# Patient Record
Sex: FEMALE | Race: WHITE | NOT HISPANIC OR LATINO | Employment: FULL TIME | URBAN - METROPOLITAN AREA
[De-identification: names, ages, dates, MRNs, and addresses within clinical notes are randomized per-mention and may not be internally consistent; named-entity substitution may affect disease eponyms.]

---

## 2017-01-09 ENCOUNTER — COMMUNICATION - HEALTHEAST (OUTPATIENT)
Dept: FAMILY MEDICINE | Facility: CLINIC | Age: 37
End: 2017-01-09

## 2017-01-10 ENCOUNTER — COMMUNICATION - HEALTHEAST (OUTPATIENT)
Dept: FAMILY MEDICINE | Facility: CLINIC | Age: 37
End: 2017-01-10

## 2017-02-09 ENCOUNTER — AMBULATORY - HEALTHEAST (OUTPATIENT)
Dept: FAMILY MEDICINE | Facility: CLINIC | Age: 37
End: 2017-02-09

## 2017-02-09 DIAGNOSIS — Z34.80 ENCOUNTER FOR SUPERVISION OF OTHER NORMAL PREGNANCY: ICD-10-CM

## 2017-02-09 DIAGNOSIS — Z32.00 POSSIBLE PREGNANCY, NOT YET CONFIRMED: ICD-10-CM

## 2017-02-13 ENCOUNTER — PRENATAL OFFICE VISIT - HEALTHEAST (OUTPATIENT)
Dept: FAMILY MEDICINE | Facility: CLINIC | Age: 37
End: 2017-02-13

## 2017-02-13 DIAGNOSIS — O30.001 TWIN GESTATION IN FIRST TRIMESTER, UNSPECIFIED MULTIPLE GESTATION TYPE: ICD-10-CM

## 2017-02-13 LAB — HIV 1+2 AB+HIV1 P24 AG SERPL QL IA: NEGATIVE

## 2017-02-14 ENCOUNTER — HOSPITAL ENCOUNTER (OUTPATIENT)
Dept: ULTRASOUND IMAGING | Facility: HOSPITAL | Age: 37
Discharge: HOME | End: 2017-02-14
Attending: FAMILY MEDICINE

## 2017-02-14 DIAGNOSIS — O30.001 TWIN GESTATION IN FIRST TRIMESTER, UNSPECIFIED MULTIPLE GESTATION TYPE: ICD-10-CM

## 2017-02-14 LAB
HBV SURFACE AG SERPL QL IA: NEGATIVE
SYPHILIS RPR SCREEN - HISTORICAL: NORMAL

## 2017-02-20 ENCOUNTER — COMMUNICATION - HEALTHEAST (OUTPATIENT)
Dept: FAMILY MEDICINE | Facility: CLINIC | Age: 37
End: 2017-02-20

## 2017-03-01 ENCOUNTER — RECORDS - HEALTHEAST (OUTPATIENT)
Dept: ADMINISTRATIVE | Facility: OTHER | Age: 37
End: 2017-03-01

## 2017-03-13 ENCOUNTER — PRENATAL OFFICE VISIT - HEALTHEAST (OUTPATIENT)
Dept: FAMILY MEDICINE | Facility: CLINIC | Age: 37
End: 2017-03-13

## 2017-03-13 DIAGNOSIS — O30.001 TWIN GESTATION IN FIRST TRIMESTER, UNSPECIFIED MULTIPLE GESTATION TYPE: ICD-10-CM

## 2017-03-15 ENCOUNTER — COMMUNICATION - HEALTHEAST (OUTPATIENT)
Dept: FAMILY MEDICINE | Facility: CLINIC | Age: 37
End: 2017-03-15

## 2017-04-07 ENCOUNTER — COMMUNICATION - HEALTHEAST (OUTPATIENT)
Dept: FAMILY MEDICINE | Facility: CLINIC | Age: 37
End: 2017-04-07

## 2017-04-13 ENCOUNTER — RECORDS - HEALTHEAST (OUTPATIENT)
Dept: ADMINISTRATIVE | Facility: OTHER | Age: 37
End: 2017-04-13

## 2017-04-24 ENCOUNTER — RECORDS - HEALTHEAST (OUTPATIENT)
Dept: ADMINISTRATIVE | Facility: OTHER | Age: 37
End: 2017-04-24

## 2017-05-05 ENCOUNTER — PRENATAL OFFICE VISIT - HEALTHEAST (OUTPATIENT)
Dept: FAMILY MEDICINE | Facility: CLINIC | Age: 37
End: 2017-05-05

## 2017-05-05 DIAGNOSIS — O30.042 DICHORIONIC DIAMNIOTIC TWIN PREGNANCY IN SECOND TRIMESTER: ICD-10-CM

## 2017-05-05 DIAGNOSIS — Z34.80 ENCOUNTER FOR SUPERVISION OF OTHER NORMAL PREGNANCY: ICD-10-CM

## 2017-05-26 ENCOUNTER — RECORDS - HEALTHEAST (OUTPATIENT)
Dept: ADMINISTRATIVE | Facility: OTHER | Age: 37
End: 2017-05-26

## 2017-06-09 ENCOUNTER — COMMUNICATION - HEALTHEAST (OUTPATIENT)
Dept: FAMILY MEDICINE | Facility: CLINIC | Age: 37
End: 2017-06-09

## 2017-06-09 ENCOUNTER — RECORDS - HEALTHEAST (OUTPATIENT)
Dept: ADMINISTRATIVE | Facility: OTHER | Age: 37
End: 2017-06-09

## 2017-06-16 ENCOUNTER — PRENATAL OFFICE VISIT - HEALTHEAST (OUTPATIENT)
Dept: FAMILY MEDICINE | Facility: CLINIC | Age: 37
End: 2017-06-16

## 2017-06-16 DIAGNOSIS — Z67.91 RH NEGATIVE STATE IN ANTEPARTUM PERIOD, THIRD TRIMESTER: ICD-10-CM

## 2017-06-16 DIAGNOSIS — Z34.80 ENCOUNTER FOR SUPERVISION OF OTHER NORMAL PREGNANCY: ICD-10-CM

## 2017-06-16 DIAGNOSIS — O26.893 RH NEGATIVE STATE IN ANTEPARTUM PERIOD, THIRD TRIMESTER: ICD-10-CM

## 2017-06-16 DIAGNOSIS — O30.043 DICHORIONIC DIAMNIOTIC TWIN PREGNANCY IN THIRD TRIMESTER: ICD-10-CM

## 2017-06-19 LAB — SYPHILIS RPR SCREEN - HISTORICAL: NORMAL

## 2017-07-07 ENCOUNTER — PRENATAL OFFICE VISIT - HEALTHEAST (OUTPATIENT)
Dept: FAMILY MEDICINE | Facility: CLINIC | Age: 37
End: 2017-07-07

## 2017-07-07 DIAGNOSIS — Z34.80 ENCOUNTER FOR SUPERVISION OF OTHER NORMAL PREGNANCY: ICD-10-CM

## 2017-07-12 ENCOUNTER — COMMUNICATION - HEALTHEAST (OUTPATIENT)
Dept: FAMILY MEDICINE | Facility: CLINIC | Age: 37
End: 2017-07-12

## 2017-07-12 DIAGNOSIS — O30.009 TWIN PREGNANCY: ICD-10-CM

## 2017-07-18 ENCOUNTER — COMMUNICATION - HEALTHEAST (OUTPATIENT)
Dept: FAMILY MEDICINE | Facility: CLINIC | Age: 37
End: 2017-07-18

## 2017-08-04 ENCOUNTER — PRENATAL OFFICE VISIT - HEALTHEAST (OUTPATIENT)
Dept: FAMILY MEDICINE | Facility: CLINIC | Age: 37
End: 2017-08-04

## 2017-08-04 DIAGNOSIS — Z34.80 ENCOUNTER FOR SUPERVISION OF OTHER NORMAL PREGNANCY: ICD-10-CM

## 2017-08-07 ENCOUNTER — HOSPITAL ENCOUNTER (OUTPATIENT)
Dept: OBGYN | Facility: HOSPITAL | Age: 37
Discharge: HOME | End: 2017-08-07
Attending: OBSTETRICS & GYNECOLOGY | Admitting: OBSTETRICS & GYNECOLOGY

## 2017-08-07 ASSESSMENT — MIFFLIN-ST. JEOR: SCORE: 1618.9

## 2017-08-15 ENCOUNTER — RECORDS - HEALTHEAST (OUTPATIENT)
Dept: ADMINISTRATIVE | Facility: OTHER | Age: 37
End: 2017-08-15

## 2017-08-15 ENCOUNTER — COMMUNICATION - HEALTHEAST (OUTPATIENT)
Dept: FAMILY MEDICINE | Facility: CLINIC | Age: 37
End: 2017-08-15

## 2017-08-22 ENCOUNTER — COMMUNICATION - HEALTHEAST (OUTPATIENT)
Dept: FAMILY MEDICINE | Facility: CLINIC | Age: 37
End: 2017-08-22

## 2017-08-30 ENCOUNTER — SURGERY - HEALTHEAST (OUTPATIENT)
Dept: OBGYN | Facility: HOSPITAL | Age: 37
End: 2017-08-30

## 2017-08-30 ENCOUNTER — ANESTHESIA - HEALTHEAST (OUTPATIENT)
Dept: OBGYN | Facility: HOSPITAL | Age: 37
End: 2017-08-30

## 2017-08-30 ASSESSMENT — MIFFLIN-ST. JEOR: SCORE: 1659.72

## 2017-08-31 ENCOUNTER — HOME CARE/HOSPICE - HEALTHEAST (OUTPATIENT)
Dept: HOME HEALTH SERVICES | Facility: HOME HEALTH | Age: 37
End: 2017-08-31

## 2017-09-06 ENCOUNTER — COMMUNICATION - HEALTHEAST (OUTPATIENT)
Dept: OBGYN | Facility: HOSPITAL | Age: 37
End: 2017-09-06

## 2017-10-08 ENCOUNTER — COMMUNICATION - HEALTHEAST (OUTPATIENT)
Dept: FAMILY MEDICINE | Facility: CLINIC | Age: 37
End: 2017-10-08

## 2017-10-13 ENCOUNTER — OFFICE VISIT - HEALTHEAST (OUTPATIENT)
Dept: FAMILY MEDICINE | Facility: CLINIC | Age: 37
End: 2017-10-13

## 2017-10-13 ASSESSMENT — MIFFLIN-ST. JEOR: SCORE: 1546.32

## 2017-10-19 ENCOUNTER — COMMUNICATION - HEALTHEAST (OUTPATIENT)
Dept: SCHEDULING | Facility: CLINIC | Age: 37
End: 2017-10-19

## 2017-12-03 ENCOUNTER — COMMUNICATION - HEALTHEAST (OUTPATIENT)
Dept: FAMILY MEDICINE | Facility: CLINIC | Age: 37
End: 2017-12-03

## 2018-02-05 ENCOUNTER — COMMUNICATION - HEALTHEAST (OUTPATIENT)
Dept: SCHEDULING | Facility: CLINIC | Age: 38
End: 2018-02-05

## 2018-06-29 ENCOUNTER — COMMUNICATION - HEALTHEAST (OUTPATIENT)
Dept: FAMILY MEDICINE | Facility: CLINIC | Age: 38
End: 2018-06-29

## 2018-07-12 ENCOUNTER — OFFICE VISIT - HEALTHEAST (OUTPATIENT)
Dept: FAMILY MEDICINE | Facility: CLINIC | Age: 38
End: 2018-07-12

## 2018-07-12 DIAGNOSIS — M22.2X1 PATELLOFEMORAL PAIN SYNDROME OF RIGHT KNEE: ICD-10-CM

## 2018-07-12 DIAGNOSIS — M13.0 POLYARTHRITIS OF HAND: ICD-10-CM

## 2018-07-12 DIAGNOSIS — M79.672 PAIN IN BOTH FEET: ICD-10-CM

## 2018-07-12 DIAGNOSIS — M79.671 PAIN IN BOTH FEET: ICD-10-CM

## 2018-07-12 LAB
CK SERPL-CCNC: 89 U/L (ref 30–190)
ERYTHROCYTE [SEDIMENTATION RATE] IN BLOOD BY WESTERGREN METHOD: 21 MM/HR (ref 0–20)
RHEUMATOID FACT SERPL-ACNC: 17.1 IU/ML (ref 0–30)
TSH SERPL DL<=0.005 MIU/L-ACNC: 2.08 UIU/ML (ref 0.3–5)

## 2018-07-12 RX ORDER — CHLORAL HYDRATE 500 MG
2 CAPSULE ORAL DAILY
Status: SHIPPED | COMMUNITY
Start: 2018-07-12

## 2018-07-13 LAB
25(OH)D3 SERPL-MCNC: 36.6 NG/ML (ref 30–80)
25(OH)D3 SERPL-MCNC: 36.6 NG/ML (ref 30–80)
B BURGDOR IGG+IGM SER QL: 0.08 INDEX VALUE

## 2018-07-16 LAB — ANA SER QL: 0.1 U

## 2019-08-29 ENCOUNTER — COMMUNICATION - HEALTHEAST (OUTPATIENT)
Dept: FAMILY MEDICINE | Facility: CLINIC | Age: 39
End: 2019-08-29

## 2019-10-02 ENCOUNTER — OFFICE VISIT - HEALTHEAST (OUTPATIENT)
Dept: FAMILY MEDICINE | Facility: CLINIC | Age: 39
End: 2019-10-02

## 2019-10-02 DIAGNOSIS — E78.2 MIXED HYPERLIPIDEMIA: ICD-10-CM

## 2019-10-02 DIAGNOSIS — N92.6 IRREGULAR MENSES: ICD-10-CM

## 2019-10-02 DIAGNOSIS — Z00.00 ROUTINE GENERAL MEDICAL EXAMINATION AT A HEALTH CARE FACILITY: ICD-10-CM

## 2019-10-02 ASSESSMENT — MIFFLIN-ST. JEOR: SCORE: 1526.47

## 2019-10-03 LAB
HPV SOURCE: NORMAL
HUMAN PAPILLOMA VIRUS 16 DNA: NEGATIVE
HUMAN PAPILLOMA VIRUS 18 DNA: NEGATIVE
HUMAN PAPILLOMA VIRUS FINAL DIAGNOSIS: NORMAL
HUMAN PAPILLOMA VIRUS OTHER HR: NEGATIVE
SPECIMEN DESCRIPTION: NORMAL

## 2019-10-10 LAB

## 2019-12-03 ENCOUNTER — COMMUNICATION - HEALTHEAST (OUTPATIENT)
Dept: FAMILY MEDICINE | Facility: CLINIC | Age: 39
End: 2019-12-03

## 2019-12-12 ENCOUNTER — OFFICE VISIT - HEALTHEAST (OUTPATIENT)
Dept: FAMILY MEDICINE | Facility: CLINIC | Age: 39
End: 2019-12-12

## 2019-12-12 DIAGNOSIS — B07.0 PLANTAR WARTS: ICD-10-CM

## 2019-12-12 DIAGNOSIS — Z23 NEED FOR INFLUENZA VACCINATION: ICD-10-CM

## 2019-12-30 ENCOUNTER — AMBULATORY - HEALTHEAST (OUTPATIENT)
Dept: LAB | Facility: CLINIC | Age: 39
End: 2019-12-30

## 2019-12-30 DIAGNOSIS — E78.2 MIXED HYPERLIPIDEMIA: ICD-10-CM

## 2019-12-30 LAB
CHOLEST SERPL-MCNC: 263 MG/DL
FASTING STATUS PATIENT QL REPORTED: YES
HDLC SERPL-MCNC: 46 MG/DL
LDLC SERPL CALC-MCNC: 139 MG/DL
TRIGL SERPL-MCNC: 389 MG/DL

## 2019-12-31 ENCOUNTER — COMMUNICATION - HEALTHEAST (OUTPATIENT)
Dept: FAMILY MEDICINE | Facility: CLINIC | Age: 39
End: 2019-12-31

## 2020-04-13 ENCOUNTER — COMMUNICATION - HEALTHEAST (OUTPATIENT)
Dept: FAMILY MEDICINE | Facility: CLINIC | Age: 40
End: 2020-04-13

## 2020-04-15 ENCOUNTER — COMMUNICATION - HEALTHEAST (OUTPATIENT)
Dept: FAMILY MEDICINE | Facility: CLINIC | Age: 40
End: 2020-04-15

## 2020-04-20 ENCOUNTER — OFFICE VISIT - HEALTHEAST (OUTPATIENT)
Dept: FAMILY MEDICINE | Facility: CLINIC | Age: 40
End: 2020-04-20

## 2020-04-20 DIAGNOSIS — D22.5: ICD-10-CM

## 2020-05-06 ENCOUNTER — COMMUNICATION - HEALTHEAST (OUTPATIENT)
Dept: FAMILY MEDICINE | Facility: CLINIC | Age: 40
End: 2020-05-06

## 2020-05-06 DIAGNOSIS — D22.9 CHANGE IN MOLE: ICD-10-CM

## 2020-05-15 ENCOUNTER — RECORDS - HEALTHEAST (OUTPATIENT)
Dept: ADMINISTRATIVE | Facility: OTHER | Age: 40
End: 2020-05-15

## 2020-07-16 ENCOUNTER — COMMUNICATION - HEALTHEAST (OUTPATIENT)
Dept: FAMILY MEDICINE | Facility: CLINIC | Age: 40
End: 2020-07-16

## 2020-07-16 DIAGNOSIS — N92.6 IRREGULAR MENSES: ICD-10-CM

## 2020-07-16 RX ORDER — NORETHINDRONE ACETATE AND ETHINYL ESTRADIOL 1.5; 3 MG/1; UG/1
TABLET ORAL
Qty: 21 TABLET | Refills: 8 | Status: SHIPPED | OUTPATIENT
Start: 2020-07-16 | End: 2022-03-09

## 2020-09-25 ENCOUNTER — RECORDS - HEALTHEAST (OUTPATIENT)
Dept: ADMINISTRATIVE | Facility: OTHER | Age: 40
End: 2020-09-25

## 2020-10-07 ENCOUNTER — RECORDS - HEALTHEAST (OUTPATIENT)
Dept: ADMINISTRATIVE | Facility: OTHER | Age: 40
End: 2020-10-07

## 2020-11-06 ENCOUNTER — RECORDS - HEALTHEAST (OUTPATIENT)
Dept: ADMINISTRATIVE | Facility: OTHER | Age: 40
End: 2020-11-06

## 2020-11-18 ENCOUNTER — OFFICE VISIT - HEALTHEAST (OUTPATIENT)
Dept: FAMILY MEDICINE | Facility: CLINIC | Age: 40
End: 2020-11-18

## 2020-11-18 DIAGNOSIS — Z13.1 SCREENING FOR DIABETES MELLITUS: ICD-10-CM

## 2020-11-18 DIAGNOSIS — E78.2 MIXED HYPERLIPIDEMIA: ICD-10-CM

## 2020-11-18 DIAGNOSIS — Z00.00 ROUTINE GENERAL MEDICAL EXAMINATION AT A HEALTH CARE FACILITY: ICD-10-CM

## 2020-11-18 DIAGNOSIS — N92.6 IRREGULAR MENSES: ICD-10-CM

## 2020-11-18 DIAGNOSIS — M25.512 LEFT SHOULDER PAIN, UNSPECIFIED CHRONICITY: ICD-10-CM

## 2020-11-18 LAB
CHOLEST SERPL-MCNC: 245 MG/DL
FASTING STATUS PATIENT QL REPORTED: YES
FASTING STATUS PATIENT QL REPORTED: YES
GLUCOSE BLD-MCNC: 74 MG/DL (ref 70–99)
HDLC SERPL-MCNC: 51 MG/DL
LDLC SERPL CALC-MCNC: 159 MG/DL
PROLACTIN SERPL-MCNC: 7.1 NG/ML (ref 0–20)
T4 FREE SERPL-MCNC: 1 NG/DL (ref 0.7–1.8)
TRIGL SERPL-MCNC: 174 MG/DL
TSH SERPL DL<=0.005 MIU/L-ACNC: 2.46 UIU/ML (ref 0.3–5)

## 2020-11-18 RX ORDER — KETOCONAZOLE 20 MG/G
CREAM TOPICAL
Status: SHIPPED | COMMUNITY
Start: 2020-11-06 | End: 2022-11-02

## 2020-11-18 ASSESSMENT — MIFFLIN-ST. JEOR: SCORE: 1505.77

## 2020-12-04 ENCOUNTER — OFFICE VISIT - HEALTHEAST (OUTPATIENT)
Dept: PHYSICAL THERAPY | Facility: REHABILITATION | Age: 40
End: 2020-12-04

## 2020-12-04 DIAGNOSIS — M25.612 DECREASED ROM OF LEFT SHOULDER: ICD-10-CM

## 2020-12-04 DIAGNOSIS — G89.29 CHRONIC PAIN OF BOTH SHOULDERS: ICD-10-CM

## 2020-12-04 DIAGNOSIS — M75.42 IMPINGEMENT SYNDROME OF LEFT SHOULDER: ICD-10-CM

## 2020-12-04 DIAGNOSIS — M75.41 IMPINGEMENT SYNDROME OF RIGHT SHOULDER: ICD-10-CM

## 2020-12-04 DIAGNOSIS — M25.511 CHRONIC PAIN OF BOTH SHOULDERS: ICD-10-CM

## 2020-12-04 DIAGNOSIS — M25.512 CHRONIC PAIN OF BOTH SHOULDERS: ICD-10-CM

## 2020-12-04 DIAGNOSIS — M25.611 DECREASED ROM OF RIGHT SHOULDER: ICD-10-CM

## 2020-12-11 ENCOUNTER — OFFICE VISIT - HEALTHEAST (OUTPATIENT)
Dept: PHYSICAL THERAPY | Facility: REHABILITATION | Age: 40
End: 2020-12-11

## 2020-12-11 DIAGNOSIS — M75.41 IMPINGEMENT SYNDROME OF RIGHT SHOULDER: ICD-10-CM

## 2020-12-11 DIAGNOSIS — M25.611 DECREASED ROM OF RIGHT SHOULDER: ICD-10-CM

## 2020-12-11 DIAGNOSIS — M75.42 IMPINGEMENT SYNDROME OF LEFT SHOULDER: ICD-10-CM

## 2020-12-11 DIAGNOSIS — M25.511 CHRONIC PAIN OF BOTH SHOULDERS: ICD-10-CM

## 2020-12-11 DIAGNOSIS — M25.512 CHRONIC PAIN OF BOTH SHOULDERS: ICD-10-CM

## 2020-12-11 DIAGNOSIS — G89.29 CHRONIC PAIN OF BOTH SHOULDERS: ICD-10-CM

## 2020-12-11 DIAGNOSIS — M25.612 DECREASED ROM OF LEFT SHOULDER: ICD-10-CM

## 2020-12-18 ENCOUNTER — OFFICE VISIT - HEALTHEAST (OUTPATIENT)
Dept: PHYSICAL THERAPY | Facility: REHABILITATION | Age: 40
End: 2020-12-18

## 2020-12-18 DIAGNOSIS — M25.611 DECREASED ROM OF RIGHT SHOULDER: ICD-10-CM

## 2020-12-18 DIAGNOSIS — M25.512 CHRONIC PAIN OF BOTH SHOULDERS: ICD-10-CM

## 2020-12-18 DIAGNOSIS — M75.41 IMPINGEMENT SYNDROME OF RIGHT SHOULDER: ICD-10-CM

## 2020-12-18 DIAGNOSIS — M75.42 IMPINGEMENT SYNDROME OF LEFT SHOULDER: ICD-10-CM

## 2020-12-18 DIAGNOSIS — M25.511 CHRONIC PAIN OF BOTH SHOULDERS: ICD-10-CM

## 2020-12-18 DIAGNOSIS — G89.29 CHRONIC PAIN OF BOTH SHOULDERS: ICD-10-CM

## 2020-12-18 DIAGNOSIS — M25.612 DECREASED ROM OF LEFT SHOULDER: ICD-10-CM

## 2020-12-22 ENCOUNTER — OFFICE VISIT - HEALTHEAST (OUTPATIENT)
Dept: PHYSICAL THERAPY | Facility: REHABILITATION | Age: 40
End: 2020-12-22

## 2020-12-22 DIAGNOSIS — M75.41 IMPINGEMENT SYNDROME OF RIGHT SHOULDER: ICD-10-CM

## 2020-12-22 DIAGNOSIS — M25.512 CHRONIC PAIN OF BOTH SHOULDERS: ICD-10-CM

## 2020-12-22 DIAGNOSIS — M25.611 DECREASED ROM OF RIGHT SHOULDER: ICD-10-CM

## 2020-12-22 DIAGNOSIS — M25.511 CHRONIC PAIN OF BOTH SHOULDERS: ICD-10-CM

## 2020-12-22 DIAGNOSIS — M25.612 DECREASED ROM OF LEFT SHOULDER: ICD-10-CM

## 2020-12-22 DIAGNOSIS — M75.42 IMPINGEMENT SYNDROME OF LEFT SHOULDER: ICD-10-CM

## 2020-12-22 DIAGNOSIS — G89.29 CHRONIC PAIN OF BOTH SHOULDERS: ICD-10-CM

## 2020-12-29 ENCOUNTER — OFFICE VISIT - HEALTHEAST (OUTPATIENT)
Dept: PHYSICAL THERAPY | Facility: REHABILITATION | Age: 40
End: 2020-12-29

## 2020-12-29 DIAGNOSIS — M25.611 DECREASED ROM OF RIGHT SHOULDER: ICD-10-CM

## 2020-12-29 DIAGNOSIS — M25.511 CHRONIC PAIN OF BOTH SHOULDERS: ICD-10-CM

## 2020-12-29 DIAGNOSIS — M25.512 CHRONIC PAIN OF BOTH SHOULDERS: ICD-10-CM

## 2020-12-29 DIAGNOSIS — M75.42 IMPINGEMENT SYNDROME OF LEFT SHOULDER: ICD-10-CM

## 2020-12-29 DIAGNOSIS — M25.612 DECREASED ROM OF LEFT SHOULDER: ICD-10-CM

## 2020-12-29 DIAGNOSIS — M75.41 IMPINGEMENT SYNDROME OF RIGHT SHOULDER: ICD-10-CM

## 2020-12-29 DIAGNOSIS — G89.29 CHRONIC PAIN OF BOTH SHOULDERS: ICD-10-CM

## 2021-01-05 ENCOUNTER — OFFICE VISIT - HEALTHEAST (OUTPATIENT)
Dept: PHYSICAL THERAPY | Facility: REHABILITATION | Age: 41
End: 2021-01-05

## 2021-01-05 DIAGNOSIS — M75.42 IMPINGEMENT SYNDROME OF LEFT SHOULDER: ICD-10-CM

## 2021-01-05 DIAGNOSIS — M25.511 CHRONIC PAIN OF BOTH SHOULDERS: ICD-10-CM

## 2021-01-05 DIAGNOSIS — G89.29 CHRONIC PAIN OF BOTH SHOULDERS: ICD-10-CM

## 2021-01-05 DIAGNOSIS — M25.612 DECREASED ROM OF LEFT SHOULDER: ICD-10-CM

## 2021-01-05 DIAGNOSIS — M25.512 CHRONIC PAIN OF BOTH SHOULDERS: ICD-10-CM

## 2021-01-05 DIAGNOSIS — M75.41 IMPINGEMENT SYNDROME OF RIGHT SHOULDER: ICD-10-CM

## 2021-01-05 DIAGNOSIS — M25.611 DECREASED ROM OF RIGHT SHOULDER: ICD-10-CM

## 2021-01-15 ENCOUNTER — OFFICE VISIT - HEALTHEAST (OUTPATIENT)
Dept: PHYSICAL THERAPY | Facility: REHABILITATION | Age: 41
End: 2021-01-15

## 2021-01-15 DIAGNOSIS — M25.511 CHRONIC PAIN OF BOTH SHOULDERS: ICD-10-CM

## 2021-01-15 DIAGNOSIS — M75.41 IMPINGEMENT SYNDROME OF RIGHT SHOULDER: ICD-10-CM

## 2021-01-15 DIAGNOSIS — M25.611 DECREASED ROM OF RIGHT SHOULDER: ICD-10-CM

## 2021-01-15 DIAGNOSIS — G89.29 CHRONIC PAIN OF BOTH SHOULDERS: ICD-10-CM

## 2021-01-15 DIAGNOSIS — M25.512 CHRONIC PAIN OF BOTH SHOULDERS: ICD-10-CM

## 2021-01-15 DIAGNOSIS — M25.612 DECREASED ROM OF LEFT SHOULDER: ICD-10-CM

## 2021-01-15 DIAGNOSIS — M75.42 IMPINGEMENT SYNDROME OF LEFT SHOULDER: ICD-10-CM

## 2021-01-22 ENCOUNTER — OFFICE VISIT - HEALTHEAST (OUTPATIENT)
Dept: PHYSICAL THERAPY | Facility: REHABILITATION | Age: 41
End: 2021-01-22

## 2021-01-22 DIAGNOSIS — G89.29 CHRONIC PAIN OF BOTH SHOULDERS: ICD-10-CM

## 2021-01-22 DIAGNOSIS — M25.512 CHRONIC PAIN OF BOTH SHOULDERS: ICD-10-CM

## 2021-01-22 DIAGNOSIS — M25.611 DECREASED ROM OF RIGHT SHOULDER: ICD-10-CM

## 2021-01-22 DIAGNOSIS — M75.41 IMPINGEMENT SYNDROME OF RIGHT SHOULDER: ICD-10-CM

## 2021-01-22 DIAGNOSIS — M25.612 DECREASED ROM OF LEFT SHOULDER: ICD-10-CM

## 2021-01-22 DIAGNOSIS — M75.42 IMPINGEMENT SYNDROME OF LEFT SHOULDER: ICD-10-CM

## 2021-01-22 DIAGNOSIS — M25.511 CHRONIC PAIN OF BOTH SHOULDERS: ICD-10-CM

## 2021-01-29 ENCOUNTER — OFFICE VISIT - HEALTHEAST (OUTPATIENT)
Dept: PHYSICAL THERAPY | Facility: REHABILITATION | Age: 41
End: 2021-01-29

## 2021-01-29 DIAGNOSIS — M75.42 IMPINGEMENT SYNDROME OF LEFT SHOULDER: ICD-10-CM

## 2021-01-29 DIAGNOSIS — G89.29 CHRONIC PAIN OF BOTH SHOULDERS: ICD-10-CM

## 2021-01-29 DIAGNOSIS — M25.612 DECREASED ROM OF LEFT SHOULDER: ICD-10-CM

## 2021-01-29 DIAGNOSIS — M25.611 DECREASED ROM OF RIGHT SHOULDER: ICD-10-CM

## 2021-01-29 DIAGNOSIS — M25.511 CHRONIC PAIN OF BOTH SHOULDERS: ICD-10-CM

## 2021-01-29 DIAGNOSIS — M75.41 IMPINGEMENT SYNDROME OF RIGHT SHOULDER: ICD-10-CM

## 2021-01-29 DIAGNOSIS — M25.512 CHRONIC PAIN OF BOTH SHOULDERS: ICD-10-CM

## 2021-02-05 ENCOUNTER — OFFICE VISIT - HEALTHEAST (OUTPATIENT)
Dept: PHYSICAL THERAPY | Facility: REHABILITATION | Age: 41
End: 2021-02-05

## 2021-02-05 DIAGNOSIS — M25.611 DECREASED ROM OF RIGHT SHOULDER: ICD-10-CM

## 2021-02-05 DIAGNOSIS — M25.612 DECREASED ROM OF LEFT SHOULDER: ICD-10-CM

## 2021-02-05 DIAGNOSIS — G89.29 CHRONIC PAIN OF BOTH SHOULDERS: ICD-10-CM

## 2021-02-05 DIAGNOSIS — M25.511 CHRONIC PAIN OF BOTH SHOULDERS: ICD-10-CM

## 2021-02-05 DIAGNOSIS — M75.42 IMPINGEMENT SYNDROME OF LEFT SHOULDER: ICD-10-CM

## 2021-02-05 DIAGNOSIS — M25.512 CHRONIC PAIN OF BOTH SHOULDERS: ICD-10-CM

## 2021-02-05 DIAGNOSIS — M75.41 IMPINGEMENT SYNDROME OF RIGHT SHOULDER: ICD-10-CM

## 2021-02-12 ENCOUNTER — OFFICE VISIT - HEALTHEAST (OUTPATIENT)
Dept: PHYSICAL THERAPY | Facility: REHABILITATION | Age: 41
End: 2021-02-12

## 2021-02-12 DIAGNOSIS — M25.611 DECREASED ROM OF RIGHT SHOULDER: ICD-10-CM

## 2021-02-12 DIAGNOSIS — M25.512 CHRONIC PAIN OF BOTH SHOULDERS: ICD-10-CM

## 2021-02-12 DIAGNOSIS — M25.511 CHRONIC PAIN OF BOTH SHOULDERS: ICD-10-CM

## 2021-02-12 DIAGNOSIS — M75.41 IMPINGEMENT SYNDROME OF RIGHT SHOULDER: ICD-10-CM

## 2021-02-12 DIAGNOSIS — G89.29 CHRONIC PAIN OF BOTH SHOULDERS: ICD-10-CM

## 2021-02-12 DIAGNOSIS — M25.612 DECREASED ROM OF LEFT SHOULDER: ICD-10-CM

## 2021-02-12 DIAGNOSIS — M75.42 IMPINGEMENT SYNDROME OF LEFT SHOULDER: ICD-10-CM

## 2021-05-30 VITALS — BODY MASS INDEX: 29.7 KG/M2 | WEIGHT: 184 LBS

## 2021-05-30 VITALS — BODY MASS INDEX: 30.83 KG/M2 | WEIGHT: 191 LBS

## 2021-05-30 VITALS — WEIGHT: 180.25 LBS | BODY MASS INDEX: 29.09 KG/M2

## 2021-05-31 VITALS — BODY MASS INDEX: 33.81 KG/M2 | HEIGHT: 65 IN | BODY MASS INDEX: 34.82 KG/M2 | WEIGHT: 209.5 LBS | WEIGHT: 209 LBS

## 2021-05-31 VITALS — WEIGHT: 218 LBS | HEIGHT: 65 IN | BODY MASS INDEX: 36.32 KG/M2

## 2021-05-31 VITALS — BODY MASS INDEX: 33.13 KG/M2 | WEIGHT: 205.25 LBS

## 2021-05-31 VITALS — BODY MASS INDEX: 32.04 KG/M2 | WEIGHT: 198.5 LBS

## 2021-05-31 VITALS — HEIGHT: 65 IN | BODY MASS INDEX: 32.15 KG/M2 | WEIGHT: 193 LBS

## 2021-06-01 VITALS — BODY MASS INDEX: 31.78 KG/M2 | WEIGHT: 191 LBS

## 2021-06-01 NOTE — PROGRESS NOTES
FEMALE ADULT PREVENTIVE EXAM    CHIEF COMPLAINT:  Female preventive exam.    SUBJECTIVE:  Delma Bobo is a 39 y.o. female who presents for her routine physical exam.    Patient would like to address the following concerns today:     Just started a new job [employer].    Armpit tenderness:  Little bit better.  ? If menstrual.   She will keep track.  No lumps, rashes or axillary lymphadenopathy today.    Irregular menses:  Bleeding is irregular.  Mostly every 28 days, but occasional bleeding 2 weeks later.  Some more clotting.  More anxiety in the week before.  Feels like emotions are more labile.  More acne jawline with thicker hairs on jawline.  Dipika had Type 2 diabetes and grew darker hair on jawline.    Orthocyclen - yeast  Ember - spotting  Microgestin 1. - worked well for years.      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        GYNE HISTORY  Menses: Since delivery of twins, irregular.  Most cycles are 25-28 days, but 3-4 times it has been only 2 weeks.  Had irregular menses in high school for which she was on OCPs chronically.  Sexually Active: with .  Contraception:  got a vasectomy.  Last Pap:  - normal  Abnormal Pap: none  Vaginal Discharge: no  Menarche:  [age].  Had irregular menses started at 13-14, and started on OCPs [age].      She  has a past medical history of Abnormal Pap smear of cervix, Allergic, and Varicella.    Lab Results   Component Value Date    WBC 8.2 2017    HGB 12.5 10/13/2017    HCT 35.8 2017    MCV 91 2017     2017     Lab Results   Component Value Date    CHOL 254 (H) 2014    HDL 60 2014    LDLCALC 169 (H) 2014    TRIG 124 2014     Lab Results   Component Value Date    TSH 2.08 2018     BP Readings from Last 3 Encounters:   10/02/19 110/62   18 94/54   10/13/17 110/58       Surgeries:    Past Surgical History:   Procedure Laterality Date      SECTION N/A  2017    Procedure:  SECTION;  Surgeon: Omero Purdy MD;  Location: [facility];  Service:      CT INCISE FINGER TENDON SHEATH      Description: Hand Incision Tendon Sheath Of A Finger;  Recorded: 2011;  Comments: 2nd grade       Family History:  Her family history includes Breast cancer in her maternal grandfather and maternal grandmother; Cancer in her maternal grandmother; Diabetes in her father and maternal aunt; Diabetes (age of onset: 60) in her paternal aunt; Early death in her father; Heart disease (age of onset: 58) in her father.    Social History:  She  reports that she has quit smoking. She smoked 0.00 packs per day. She has never used smokeless tobacco. She reports that she does not drink alcohol or use drugs.    Medications:    Current Outpatient Medications:      cholecalciferol, vitamin D3, 2,000 unit cap, Take 1 capsule by mouth once daily., Disp: , Rfl:      ferrous sulfate 325 (65 FE) MG tablet, Take 1 tablet (325 mg total) by mouth 3 (three) times a day with meals., Disp: , Rfl: 0     ibuprofen (ADVIL,MOTRIN) 600 MG tablet, Take 1 tablet (600 mg total) by mouth every 6 (six) hours., Disp: 30 tablet, Rfl: 0     Lactobacillus acidophilus (PROBIOTIC ORAL), Take 1 tablet by mouth daily., Disp: , Rfl:      omega-3/dha/epa/fish oil (FISH OIL-OMEGA-3 FATTY ACIDS) 300-1,000 mg capsule, Take 2 g by mouth daily., Disp: , Rfl:      prenatal vitamin iron-folic acid 27mg-0.8mg (PRENATAL S) 27 mg iron- 800 mcg Tab tablet, Take 1 tablet by mouth daily., Disp: , Rfl:      TURMERIC ROOT EXTRACT ORAL, Take 500 mg by mouth daily., Disp: , Rfl:      UNABLE TO FIND, Med Name: Calcium and Magnesium: 500/250 mg daily, Disp: , Rfl:      norethindrone ac-eth estradiol (MICROGESTIN 1.5/30, 21,) 1.5-30 mg-mcg Tab per tablet, Take 1 tablet by mouth daily., Disp: 3 Package, Rfl: 3      Allergies:  No latex allergies.  Allergies   Allergen Reactions     Gluten      Digestion, hand eczema,  constipation            RISK BEHAVIOR & HEALTH HABITS  Self Breast Exam: yes.  Regular Exercise: not really - twins and a toddler at home.  Signed up for community ed mindfulness classes.    Balanced diet: Eating more salads again  Seat Belt Use: yes  Calcium intake/Osteoporosis prevention:     Guns: NO  Sun Screen: YES  Dental Care: YES    REVIEW OF SYSTEMS:  Complete head to toe review of systems is otherwise negative except as above.    OBJECTIVE:  VITAL SIGNS:    Vitals:    10/02/19 1428   BP: 110/62   Pulse: 77   Resp: 16   Temp: 98.1  F (36.7  C)   SpO2: 98%     GENERAL:  Patient alert, in no acute distress.  EYES: PERRLA. Extraoccular movements intact, pupils equal, reactive to light and accommodation.  Normal conjunctiva and lids.  Undilated fudoscopic exam normal, including normal size, appearance cup-to-disc ratio.  Normal posterior segments, including no exudates or hemorrhages.  ENT:  Hearing grossly normal.  Normal appearance to ears and nose.  Bilateral TM s, external canals, oropharynx normal. Normal lips, gums and teeth.  Normal nasal mucosa, septum and turbinates.  NECK:  Supple, without thyromegaly or mass.  RESP:  Clear to auscultation without crackles, wheezes or distress.  Normal respiratory effort.   CV:  Regular rate and rhythm without murmurs, rubs or gallops.  Normal carotid, abdominal aorta, femoral and pedal pulses.  No varicosities or edema.  ABDOMEN:  Soft, non-tender, without hepatosplenomegaly, masses, or hernias.   BREASTS:  Nontender, without masses, nipple discharge, erythema, or axillary adenopathy.    :    External genitalia:  Normal without lesions.  Urethra: Normal appearing  Vagina: Normal without discharge  Cervix: normal.  Uterus:  Nontender, mobile, without masses  Ovaries: Normal without masses  LYMPHATIC: Normal palpation of neck, groin and axilla..  No lymphadenopathy.  No bruising.  NEURO:  CN II-XII intact, motor & sensory function all intact.  DTR and reflexes  normal.  PSYCHIATRIC:  Alert & oriented with normal mood and affect.  Good judgment and insight.  SKIN:  Normal inspection and palpation.  MUSCULOSKELETAL: Normal gait and station.  - Spine / Ribs / Pelvis: Normal inspection, ROM, stability and strength: Spine, Head, Neck, Upper and Lower Extremities.    ASSESSMENT & PLAN  Delma was seen today for annual exam.    Diagnoses and all orders for this visit:    Routine general medical examination at a health care facility  -     Gynecologic Cytology (PAP Smear)  -     HPV High Risk DNA Cervical  Reviewed healthy lifestyle.  Patient is finding time for herself, but has not had time for exercise due to twin babies and toddler.  Reviewed family history for high risk screening.    Irregular menses  -     norethindrone ac-eth estradiol (MICROGESTIN 1.5/30, 21,) 1.5-30 mg-mcg Tab per tablet; Take 1 tablet by mouth daily.  Discussed options for treatment.  Patient would like to start on OCP.  Discussed instructions for use and potential side effects.   had a vasectomy.    Mixed hyperlipidemia  -     Lipid Cascade; Future    Other orders  -     Influenza, Seasonal Quad, PF =/> 6months

## 2021-06-03 VITALS
HEART RATE: 77 BPM | WEIGHT: 187.75 LBS | DIASTOLIC BLOOD PRESSURE: 62 MMHG | SYSTOLIC BLOOD PRESSURE: 110 MMHG | OXYGEN SATURATION: 98 % | RESPIRATION RATE: 16 BRPM | TEMPERATURE: 98.1 F | BODY MASS INDEX: 31.28 KG/M2 | HEIGHT: 65 IN

## 2021-06-03 NOTE — TELEPHONE ENCOUNTER
Spoke with patient and scheduled an appointment with a different provider. Patient wanted to be seen sooner than January. Scheduled for wart treatment on 12-12-19 with . No further action needed.

## 2021-06-04 VITALS
DIASTOLIC BLOOD PRESSURE: 80 MMHG | OXYGEN SATURATION: 99 % | HEART RATE: 72 BPM | BODY MASS INDEX: 30.63 KG/M2 | SYSTOLIC BLOOD PRESSURE: 130 MMHG | WEIGHT: 185.5 LBS

## 2021-06-04 NOTE — PROGRESS NOTES
OFFICE VISIT - FAMILY MEDICINE     ASSESSMENT AND PLAN     1. Need for influenza vaccination  CANCELED: Influenza High Dose, Seasonal 65+ yrs   2. Plantar warts     Liquid nitrogen of the plantar wart today, return if not improving 3 weeks.    CHIEF COMPLAINT   Warts (Rt. foot, under big toe; has had for over a year. )    HPI   Delma Bobo is a 39 y.o. female.  No Patient Care Coordination Note on file.    Right plantar great toe wart for the past month, not improving with symptomatic care.  ER for treatment option.  She would like to get her influenza vaccine today.      Review of Systems As per HPI, otherwise negative.    OBJECTIVE   /80 (Patient Site: Right Arm, Patient Position: Sitting, Cuff Size: Adult Regular)   Pulse 72   Wt 185 lb 8 oz (84.1 kg)   LMP 11/22/2019   SpO2 99%   BMI 30.63 kg/m    Physical Exam  Right great toe 2 to 3 mm small plantar wart treated with liquid nitrogen x3 cycles patient tolerated well, return as needed if not improving 3 weeks.  PFSH     Family History   Problem Relation Age of Onset     Breast cancer Maternal Grandmother         Post menopausal     Cancer Maternal Grandmother         Bone Cancer     Breast cancer Maternal Grandfather         Age in 60's.     Early death Father         MVA     Heart disease Father 58        2009 CABG x4, obesity, heavy smoker     Diabetes Father      Diabetes Paternal Aunt 60        Type 2 DM     Diabetes Maternal Aunt      Clotting disorder Neg Hx      Social History     Socioeconomic History     Marital status:      Spouse name: Not on file     Number of children: Not on file     Years of education: Not on file     Highest education level: Not on file   Occupational History     Not on file   Social Needs     Financial resource strain: Not on file     Food insecurity:     Worry: Not on file     Inability: Not on file     Transportation needs:     Medical: Not on file     Non-medical: Not on file   Tobacco Use      Smoking status: Former Smoker     Packs/day: 0.00     Smokeless tobacco: Never Used   Substance and Sexual Activity     Alcohol use: No     Drug use: No     Sexual activity: Yes     Partners: Male     Birth control/protection: None   Lifestyle     Physical activity:     Days per week: Not on file     Minutes per session: Not on file     Stress: Not on file   Relationships     Social connections:     Talks on phone: Not on file     Gets together: Not on file     Attends [employer] service: Not on file     Active member of club or organization: Not on file     Attends meetings of clubs or organizations: Not on file     Relationship status: Not on file     Intimate partner violence:     Fear of current or ex partner: Not on file     Emotionally abused: Not on file     Physically abused: Not on file     Forced sexual activity: Not on file   Other Topics Concern     Not on file   Social History Narrative     Not on file     Relevant history was reviewed with the patient today, unless noted in HPI, nothing is pertinent for this visit.  [facility]     Patient Active Problem List    Diagnosis Date Noted     Twin birth delivered by  section in hospital 2017     Breech presentation delivered 2017     SGA (small for gestational age), fetal, affecting care of mother, antepartum, third trimester, fetus 2 2017     Pregnancy 2017     Cervicalgia      Overview Note:     Created by Conversion         Fatigue      Overview Note:     Created by Conversion         Reaction To Chronic Stress      Overview Note:     Created by Conversion    Replacement Utility updated for latest IMO load       Vasomotor Rhinitis      Overview Note:     Created by Conversion    Replacement Utility updated for latest IMO load       Allergy To Cats      Overview Note:     Created by Conversion  [facility] Annotation: Dec 18 2011  2:20PM - Brittni Rankin: otherwise, 10/2010   allergy skin test negative         Past Surgical History:    Procedure Laterality Date      SECTION N/A 2017    Procedure:  SECTION;  Surgeon: Omero Purdy MD;  Location: [facility];  Service:      NC INCISE FINGER TENDON SHEATH      Description: Hand Incision Tendon Sheath Of A Finger;  Recorded: 2011;  Comments: 2nd grade       RESULTS/CONSULTS (Lab/Rad)   No results found for this or any previous visit (from the past 168 hour(s)).  No results found.  MEDICATIONS     Current Outpatient Medications on File Prior to Visit   Medication Sig Dispense Refill     cholecalciferol, vitamin D3, 2,000 unit cap Take 1 capsule by mouth once daily.       norethindrone ac-eth estradiol (MICROGESTIN 1.5,) 1.5-30 mg-mcg Tab per tablet Take 1 tablet by mouth daily. 3 Package 3     omega-3/dha/epa/fish oil (FISH OIL-OMEGA-3 FATTY ACIDS) 300-1,000 mg capsule Take 2 g by mouth daily.       TURMERIC ROOT EXTRACT ORAL Take 500 mg by mouth daily.       [DISCONTINUED] ferrous sulfate 325 (65 FE) MG tablet Take 1 tablet (325 mg total) by mouth 3 (three) times a day with meals.  0     [DISCONTINUED] ibuprofen (ADVIL,MOTRIN) 600 MG tablet Take 1 tablet (600 mg total) by mouth every 6 (six) hours. 30 tablet 0     [DISCONTINUED] Lactobacillus acidophilus (PROBIOTIC ORAL) Take 1 tablet by mouth daily.       [DISCONTINUED] prenatal vitamin iron-folic acid 27mg-0.8mg (PRENATAL S) 27 mg iron- 800 mcg Tab tablet Take 1 tablet by mouth daily.       [DISCONTINUED] UNABLE TO FIND Med Name: Calcium and Magnesium: 500/250 mg daily       No current facility-administered medications on file prior to visit.        HEALTH MAINTENANCE / SCREENING   PHQ-2 Total Score: 0 (10/2/2019  2:28 PM)  , No data recorded,No data recorded  Immunization History   Administered Date(s) Administered     Influenza, inj, historic,unspecified 2010     Influenza, seasonal,quad inj 6-35 mos 2011     Influenza,seasonal quad, PF, =/> 6months 10/09/2015, 2017, 10/13/2017,  10/02/2019     Rabies, IM 08/16/2016     Tdap 12/06/2010, 03/28/2016, 06/16/2017     Typhoid, historic, Unspecified 12/06/2010     Health Maintenance   Topic     PREVENTIVE CARE VISIT      PAP SMEAR      HPV TEST      ADVANCE CARE PLANNING      TD 18+ HE      HIV SCREENING      INFLUENZA VACCINE RULE BASED      TDAP ADULT ONE TIME DOSE        Isabel Seaman MD  Family Medicine, [facility]     This note was dictated using a voice recognition software.  Any grammatical or context distortion are unintentional and inherent to the software.

## 2021-06-05 VITALS
OXYGEN SATURATION: 99 % | HEIGHT: 66 IN | DIASTOLIC BLOOD PRESSURE: 70 MMHG | HEART RATE: 65 BPM | TEMPERATURE: 98.1 F | BODY MASS INDEX: 29.3 KG/M2 | WEIGHT: 182.31 LBS | SYSTOLIC BLOOD PRESSURE: 114 MMHG

## 2021-06-07 NOTE — PROGRESS NOTES
"Delma Bobo is a 40 y.o. female who is being evaluated via a billable telephone visit.      The patient has been notified of following:     \"This telephone visit will be conducted via a call between you and your physician/provider. We have found that certain health care needs can be provided without the need for a physical exam.  This service lets us provide the care you need with a short phone conversation.  If a prescription is necessary we can send it directly to your pharmacy.  If lab work is needed we can place an order for that and you can then stop by our lab to have the test done at a later time.    Telephone visits are billed at different rates depending on your insurance coverage. During this emergency period, for some insurers they may be billed the same as an in-person visit.  Please reach out to your insurance provider with any questions.    If during the course of the call the physician/provider feels a telephone visit is not appropriate, you will not be charged for this service.\"    Patient has given verbal consent to a Telephone visit? Yes    Patient would like to receive their AVS by AVS Preference: Marlyn.    Additional provider notes:    Patient has a mole on her back.  Has had mole biopsies in the past.  They were negative.  She has some sun spots, seborrheic keratoses.  She feels that she had kind of a flatter brown superficial mole in the past in this area, and sometime in the past year, possibly over the winter, and has become more three-dimensional.  She notes that the surface is little bit rough.  Please see the photo that she had submitted through my chart.  She notes from time to time it does itch a little bit.  She has no personal history of skin cancer.  No family history of skin cancer.  She notes that the diameter of the mole is similar to the size of an eraser at the top of the pencil.          Assessment/Plan:  Delma was seen today for nevus.    Diagnoses and all orders " for this visit:    Junctional nevus of back  I discussed with the patient the limitations of evaluating moles by pictures.  Based on the picture she has submitted, it does appear to be a junctional nevus.  I discussed that she should take a picture of the nevus next to a tape measure so that if there is growth, it could be more objectively assessed.  I also recommended looking for any signs of darkening color.  I encouraged her to look at [employer] pictures of melanoma.  I discussed that if it becomes increasingly irregular,and for all these reasons, she should contact the clinic and I can put in a referral for dermatology.  She agreed with this plan.          Phone call duration: 7  minutes    Cha Miranda, YONATAN

## 2021-06-08 NOTE — PROGRESS NOTES
Subjective:      Delma Bobo is being seen today for her first obstetrical visit.  This is a planned pregnancy in that they were open to another pregnancy, a little surprised at how quickly it happened however. She is at 9w3d gestation. Her obstetrical history is significant for advanced maternal age, closely spaced pregnancy. Relationship with FOB: spouse, living together. Patient does intend to breast feed. Pregnancy history fully reviewed.    Prenatal symptoms:  Intermittant nausea - improvement with eating.   She notes that she is eating more this pregnancy than with last.  Fatigue - her current child is just learning to crawl.  Bloating    Menstrual History:  OB History      Para Term  AB TAB SAB Ectopic Multiple Living    2 1 1      0 1         Patient's last menstrual period was 2016 (exact date).       The following portions of the patient's history were reviewed and updated as appropriate: allergies, current medications, past family history, past medical history, past social history, past surgical history and problem list.    Review of Systems  Pertinent items are noted in HPI.  A 12 point comprehensive review of systems was negative except as noted.      Objective:        General Appearance:    Alert, cooperative, no distress, appears stated age   Head:    Normocephalic, without obvious abnormality, atraumatic   Eyes:    PERRL, conjunctiva/corneas clear, EOM's intact, fundi     benign, both eyes   Ears:    Normal TM's and external ear canals, both ears   Nose:   Nares normal, septum midline, mucosa normal, no drainage    or sinus tenderness   Throat:   Lips, mucosa, and tongue normal; teeth and gums normal   Neck:   Supple, symmetrical, trachea midline, no adenopathy;     thyroid:  no enlargement/tenderness/nodules; no carotid    bruit or JVD   Back:     Symmetric, no curvature, ROM normal, no CVA tenderness   Lungs:     Clear to auscultation bilaterally, respirations unlabored    Chest Wall:    No tenderness or deformity    Heart:    Regular rate and rhythm, S1 and S2 normal, no murmur, rub   or gallop   Breast Exam:    No tenderness, masses, or nipple abnormality   Abdomen:     Soft, non-tender, bowel sounds active all four quadrants,     no masses, no organomegaly   Genitalia:    Normal female without lesion, discharge or tenderness   Rectal:    Not performed.   Extremities:   Extremities normal, atraumatic, no cyanosis or edema   Pulses:   2+ and symmetric all extremities   Skin:   Skin color, texture, turgor normal, no rashes or lesions   Lymph nodes:   Cervical, supraclavicular, and axillary nodes normal   Neurologic:   CNII-XII intact, normal strength, sensation and reflexes     throughout       Quick look ultrasound reveals a twin intrauterine pregnancy.  Heart rates are Twin A = 170, Twin B 160      Assessment:      Twin pregnancy at 9 and 3/7 weeks      Discussed at length how a twin pregnancy differs than kelly pregnancy, increased risk of iron deficiency anemia, gestational hypertension, GDM,  delivery.  Discussed need for increased monitoring of growth by ultrasound.  Discussed that this would require OB/Gyne management/comanagement.    Patient lives in [place], so I discussed that comanagement may prove to be more cumbersome than straight management with OB Gyne.  Patient will talk about it with her  and decide.  For now, she would like to meet with [facility] to discuss what care would look like.    History of precipitous delivery.  After 1 hour of contractions, patient arrived at OB floor at 8 cm and had delivered 20 minutes later.    A NEG blood type  Check antibody screen.  Plan for Rhogam within 72 hours of any vaginal bleeding and at 28 weeks.     Plan:      Initial labs drawn.  Prenatal vitamins.  Problem list reviewed and updated.  First trimester screening discussed.  Will get nuchal cord testing for now.  Role of ultrasound in pregnancy  discussed; fetal survey: requested.    Flu shot given today.     Follow up in 4 weeks.  100% of 50 min visit spent on counseling and coordination of care.

## 2021-06-09 NOTE — TELEPHONE ENCOUNTER
Refill Approved    Rx renewed per Medication Renewal Policy. Medication was last renewed on 10/2/19.    Jing Lyon, [facility] Triage/Med Refill 7/16/2020     Requested Prescriptions   Pending Prescriptions Disp Refills     JUNEL 1.5/30, 21, 1.5-30 mg-mcg per tablet [Pharmacy Med Name: JUNEL 1.5 MG-30 MCG TABLET] 21 tablet 11     Sig: TAKE 1 TABLET BY MOUTH EVERY DAY       Oral Contraceptives Protocol Passed - 7/16/2020 12:24 AM        Passed - Visit with PCP or prescribing provider visit in last 12 months      Last office visit with prescriber/PCP: 7/12/2018 Helen Leone MD OR same dept: 12/12/2019 Isabel Seaman MD OR same specialty: 12/12/2019 Isabel Seaman MD  Last physical: 10/2/2019 Last MTM visit: Visit date not found   Next visit within 3 mo: Visit date not found  Next physical within 3 mo: Visit date not found  Prescriber OR PCP: Helen Leone MD  Last diagnosis associated with med order: 1. Irregular menses  - JUNEL 1.5/30, 21, 1.5-30 mg-mcg per tablet [Pharmacy Med Name: JUNEL 1.5 MG-30 MCG TABLET]; TAKE 1 TABLET BY MOUTH EVERY DAY  Dispense: 21 tablet; Refill: 11    If protocol passes may refill for 12 months if within 3 months of last provider visit (or a total of 15 months).

## 2021-06-09 NOTE — PROGRESS NOTES
Patient has decided she would like comanagement of this twin pregnancy with [facility] Dr. Purdy.  Has next visit with him at 17 weeks.  Discussed 20 week ultrasound at this office.  Discussed MSAFP to be done at the 17 week visit.    Twin A 155  bpm  Twin B 150 bpm

## 2021-06-10 NOTE — PROGRESS NOTES
Patient just had her ultrasound at [facility].  One boy one girl.  Boy is A, Girl is B.  Anterior placenta.  Growth is on track.  Now q 2 weeks visits.  Her next visit is with Dr. Purdy in 2 weeks.  Next visit after that will be here for 1 hour GTT, iron studies, CBC, syphilis and Rhogam.    Twin A = 145  Twin B = 145    Taking iron supplement and prenatal vitamin.  No GI issues.  Taking Vitamin D 2,000 IU  Dr. Purdy had recommended relative rest starting at 24 weeks.

## 2021-06-11 NOTE — PROGRESS NOTES
Now will be seen weekly at [facility].  122/64 on repeat .  Twin A was vertex = boy = 150  Twin B is transverse and superior = girl = 135       Reviewed labs.  She is doing well.    Discussed next follow up in 4 weeks to determine birth plan pending fetal position.

## 2021-06-11 NOTE — PROGRESS NOTES
Ultrasound was last Friday.  Twin A was vertex  = Boy = 150  Twin B is transverse and superior = Girl.  = 135  Growth is right on gestational age.  Twin A is slightly larger.     1 hr GTT today, CBC, Syphilis.  Discussed third trimester warning signs.  Answered questions about diastasis.  Tdap today.  Rhogam today.

## 2021-06-12 NOTE — PROGRESS NOTES
Dr. TYLER Purdy at bedside to assess patient and review of status.   Bunn shows no ctx, pt states same.   FHT's of twin a/b category 1.  Active infants.    Per Dr TYLER Prudy pt discharged to home to be seen in clinic tomorrow at 10:30am.   Will receive second dose betamethasone tomorrow.   This writer reviewed with Pt discharge instructions including fetal kick counts, signs, symptoms and warnings signals.   States understanding.   DC to home stable.

## 2021-06-12 NOTE — ANESTHESIA PREPROCEDURE EVALUATION
Anesthesia Evaluation      Patient summary reviewed   No history of anesthetic complications     Airway   Mallampati: II  Neck ROM: full   Pulmonary - normal exam   (+) a smoker (former)                         Cardiovascular - negative ROS and normal exam  Exercise tolerance: > or = 4 METS   Neuro/Psych - negative ROS     Endo/Other - negative ROS      GI/Hepatic/Renal - negative ROS           Dental - normal exam                        Anesthesia Plan  Planned anesthetic: spinal    ASA 2 - emergent   Induction: intravenous   Anesthetic plan and risks discussed with: patient    Post-op plan: routine recovery

## 2021-06-12 NOTE — ANESTHESIA PROCEDURE NOTES
Spinal Block    Patient location during procedure: OR  Start time: 8/30/2017 3:40 PM  End time: 8/30/2017 3:45 PM  Reason for block: at surgeon's request and primary anesthetic    Staffing:  Performing  Anesthesiologist: JOSE KANG    Preanesthetic Checklist  Completed: patient identified, risks, benefits, and alternatives discussed, timeout performed, consent obtained, at patient's request, airway assessed, oxygen available, suction available, emergency drugs available and hand hygiene performed  Spinal Block  Patient position: sitting  Prep: ChloraPrep  Patient monitoring: heart rate, cardiac monitor, continuous pulse ox and blood pressure  Approach: midline  Location: L3-4  Injection technique: single-shot  Needle type: pencil-tip   Needle gauge: 24 G    Assessment  Sensory level: T6

## 2021-06-12 NOTE — ANESTHESIA CARE TRANSFER NOTE
Last vitals:   Vitals:    08/30/17 1655   BP: 129/72   Pulse: 75   Resp: 22   Temp: 36.5  C (97.7  F)   SpO2: 100%     Patient's level of consciousness is awake  Spontaneous respirations: yes  Maintains airway independently: yes  Dentition unchanged: yes  Oropharynx: oropharynx clear of all foreign objects    QCDR Measures:  ASA# 20 - Surgical Safety Checklist: WHO surgical safety checklist completed prior to induction  PQRS# 430 - Adult PONV Prevention: NA - Not adult patient, not GA or 3 or more risk factors NOT present  ASA# 8 - Peds PONV Prevention: NA - Not pediatric patient, not GA or 2 or more risk factors NOT present  PQRS# 424 - Mary-op Temp Management: 4559F - At least one body temp DOCUMENTED => 35.5C or 95.9F within required timeframe  PQRS# 426 - PACU Transfer Protocol: - Transfer of care checklist used  ASA# 14 - Acute Post-op Pain: ASA14B - Patient did NOT experience pain >= 7 out of 10

## 2021-06-12 NOTE — PROGRESS NOTES
1.5 weeks ago:  Twin B (girl) was transverse and superior  Twin A (boy) breech.  Cervix showed little bit of thinning at [facility].  NSTs have been normal.      Twin A = 145  Twin B = 130

## 2021-06-12 NOTE — ANESTHESIA POSTPROCEDURE EVALUATION
Patient: Delma Bobo   SECTION  Anesthesia type: spinal    Patient location: [facility] [place]  Last vitals:   Vitals:    17 0813   BP: 111/55   Pulse: 80   Resp: 16   Temp: 36.9  C (98.4  F)   SpO2: 98%     Post vital signs: stable  Level of consciousness: awake and responds to simple questions  Post-anesthesia pain: pain controlled  Post-anesthesia nausea and vomiting: no  Pulmonary: unassisted, return to baseline  Cardiovascular: stable and blood pressure at baseline  Hydration: adequate  Anesthetic events: no    QCDR Measures:  ASA# 11 - Mary-op Cardiac Arrest: ASA11B - Patient did NOT experience unanticipated cardiac arrest  ASA# 12 - Mary-op Mortality Rate: ASA12B - Patient did NOT die  ASA# 13 - PACU Re-Intubation Rate: NA - No ETT / LMA used for case  ASA# 10 - Composite Anes Safety: ASA10A - No serious adverse event    Additional Notes:

## 2021-06-13 NOTE — PROGRESS NOTES
"Patient presents for her routine postpartum exam:    Complications during delivery: twin delivery Tyler and Bryant - cseandrew.  Both kids hospitalized at 3 weeks for gastroenteritis with fever.  Mom is helping out intermittently.  Bleeding:stopped weeks ago  Breastfeeding: exclusive pumping. fortified breastmilk.  Enfacare to get to 24 kcal.  Healing: feeling fine. Csection is healing.    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  Plans for Contraception: condoms.    [place]  Depression Scale EPDS Total Score: 3 (10/13/2017  3:02 PM)      Objective:  /58  Pulse 64  Ht 5' 5\" (1.651 m)  Wt 193 lb (87.5 kg)  LMP 2016 (Exact Date)  BMI 32.12 kg/m2    General Appearance:    Alert, cooperative, no distress, appears stated age   Head:    Normocephalic, without obvious abnormality, atraumatic   Eyes:    PERRL, conjunctiva/corneas clear, EOM's intact, fundi     benign, both eyes   Ears:    Normal TM's and external ear canals, both ears   Nose:   Nares normal, septum midline, mucosa normal, no drainage    or sinus tenderness   Throat:   Lips, mucosa, and tongue normal; teeth and gums normal   Neck:   Supple, symmetrical, trachea midline, no adenopathy;     thyroid:  no enlargement/tenderness/nodules; no carotid    bruit or JVD   Back:     Symmetric, no curvature, ROM normal, no CVA tenderness   Lungs:     Clear to auscultation bilaterally, respirations unlabored   Chest Wall:    No tenderness or deformity    Heart:    Regular rate and rhythm, S1 and S2 normal, no murmur, rub   or gallop   Breast Exam:    No tenderness, masses, or nipple abnormality   Abdomen:     Soft, non-tender, bowel sounds active all four quadrants,     no masses, no organomegaly   Genitalia:    Healing perineum, normal cervix, uterus < 6 weeks, no adnexal tenderness   Rectal:    not performed   Extremities:   Extremities normal, atraumatic, no cyanosis or edema   Pulses:   2+ and symmetric all " extremities   Skin:   Skin color, texture, turgor normal, no rashes or lesions   Lymph nodes:   Cervical, supraclavicular, and axillary nodes normal   Neurologic:   CNII-XII intact, normal strength, sensation and reflexes     throughout         Assessment/Plan:    Routine Postpartum visit:  Patient is doing well and adjusting.  Patient does not have signs of postpartum depression.  Breastfeeding: is difficult with twins, she is fortifying with 24kcal.  She continues to work with lactation.  Plans for contraception: condoms while contemplating their options.

## 2021-06-13 NOTE — PROGRESS NOTES
[facility]   Initial Evaluation    Patient Name: Delma Bobo  Date of evaluation: 12/4/2020  PRECAUTIONS: none  Referral Diagnosis: Left shoulder pain, unspecified chronicity  Referring provider: Helen Leone MD  Visit Diagnosis:     ICD-10-CM    1. Chronic pain of both shoulders  M25.511     G89.29     M25.512    2. Decreased ROM of left shoulder  M25.612    3. Decreased ROM of right shoulder  M25.611    4. Impingement syndrome of left shoulder  M75.42    5. Impingement syndrome of right shoulder  M75.41        Assessment:      Pt. is appropriate for skilled PT intervention as outlined in the Plan of Care (POC).  Pt. is a good candidate for skilled PT services to improve pain levels and function.  Goals and POC established in collaboration with the patient.  Pt presents to PT with L>R shoulder pain, most consistent with impingement syndrome with concurrent proximal biceps tendonitis.  MT and HEP initiated today, and patient with good tolerance for all.  Plan to progress posterior RC and scapular stabilizer strength as tolerate, incorporating stretching, MT, and modalities PRN to reduce pain and improve QOL.    Goals:  Pt. will be independent with home exercise program in : 6 weeks  Pt. will have improved quality of sleep: Comment  Comment:: sleeping without interruptions from pain in 8 weeks.    Patient will decrease : SPADI score;by _ points;for improved quality of life;in 6 weeks  by ___ points: >= 10  Pt will: be able to reach overhead, behind for bathing, dressing, and daily household chores with pain <= 1/10 in 12 weeks.  Pt will: be able to lift/carry laundry, young kids with pain <= 1/10 in 12 weeks.      Patient's expectations/goals are realistic.    Barriers to Learning or Achieving Goals:  Chronicity of the problem.       Plan / Patient Instructions:        Plan of Care:   Authorization / Certification Number of Visits: HP  Communication with: Referral Source  Patient  "Related Instruction: Nature of Condition;Treatment plan and rationale;Self Care instruction;Basis of treatment;Body mechanics;Posture;Precautions;Next steps;Expected outcome  Times per Week: 1  Number of Weeks: 8-12  Number of Visits: 8-12  Precautions / Restrictions : none  Therapeutic Exercise: ROM;Stretching;Strengthening  Neuromuscular Reeducation: posture  Manual Therapy: soft tissue mobilization;myofascial release;joint mobilization;muscle energy  Modalities: cold pack;ultrasound;iontophoresis (PRN)      Plan for next visit: Follow-up on desk set-up. Review HEP, adding TB ER (lateral wall walks with time) and continue MT per below.     Subjective:       History of Present Illness:    Delma is a 40 y.o. female who presents to therapy today with complaints of bilateral anterior shoulder pain, L>R.   Onset: Originally started about 2.5 years ago, with having 15 month old and twins, worsening more recently this summer when starting up Zoom workouts.  Duration: Constant  Worse with reaching overhead, ABD, bathing/dressing, lifting/carrying heavier items around the house, including her kids  Better with massage, acupuncture, and recent cupping, avoiding reaching overhead  Pain Medication: Denies pain medication use.  Sleep: Reports waking 1-3x/night on average due to pain.    No previous shoulder injuries/dislocations/surgeries.    Pt seeks PT to \"eliminate pain in shoulders/upper arm.\"    Pain Ratin  Pain rating at best: 2  Pain rating at worst: 8  Pain description: aching, dull, pain, sharp, soreness and weakness     Objective:      Patient Outcome Measures :    Shoulder Pain and Disability Index (SPADI) in %: 73     Scores range from 0-100%, where a score of 0% represents minimal pain and maximal function. The minimal clincically important difference is a score reduction of 10%.    Examination  1. Chronic pain of both shoulders     2. Decreased ROM of left shoulder     3. Decreased ROM of right shoulder   "   4. Impingement syndrome of left shoulder     5. Impingement syndrome of right shoulder       Involved side: left>right  Posture Observation:      General sitting posture is  fair.  General standing posture is fair.  Shoulder/Thoracic complex: Mild bilateral scapular protraction   Mildly increased upper thoracic kyphosis    Cervical AROM:   Flexion mod loss   Extension mild loss   L/R ROT mild loss   L/R SB mod loss   All non-provocative, but noticeable tightness present bilat scalenes  Shoulder/Elbow ROM  Date:      Shoulder and Elbow ROM ( )   AROM in degrees AROM in degrees AROM in degrees    Right Left Right Left Right Left   Shoulder Flexion (0-180 ) Mild loss with ERP Mild loss with ERP       Shoulder Abduction (0-180 ) Mild loss with PF arc Mild loss with PF arc       Shoulder Extension (0-60 )         Shoulder ER (0-90 ) 50 with ERP 50 with ERP       Shoulder IR (0-70 )         Shoulder IR/EXT Thumb to T8 with ERP Thumb to T11 with ERP       Elbow Flexion (150 )         Elbow Extension (0 )          PROM in degrees PROM in degrees PROM in degrees    Right Left Right Left Right Left   Shoulder Flexion (0-180 ) 155 with  with ERP       Shoulder Abduction (0-180 ) Mild loss with ERP Mild loss with ERP       Shoulder Extension (0-60 )         Shoulder ER (0-90 ) 90 90       Shoulder IR (0-70 ) 60 with ERP 45 with ERP       Elbow Flexion (150 )         Elbow Extension (0 )           Shoulder/Elbow Strength  Date:      Shoulder/Elbow Strength (/5)  Manual Muscle Test (MMT) MMT MMT MMT    Right Left Right Left Right Left   Shoulder Flexion 5 5       Supraspinatus         Shoulder Abduction 5 5       Shoulder Extension         Shoulder External Rotation 4 4       Shoulder Internal Rotation 4+ 4+       Elbow Flexion 5 5       Elbow Extension 5 5       Other:         Other:           Shoulder Special Tests  Impingement Cluster Right (+/-) Left (+/-) Rotator Cuff Tests Right (+/-) Left (+/-)   Serge  Neer's +, + +, + Drop Arm Sign - -   Painful Arc + + Hornblowers     Infraspinatus Test + + ERLS     AC Tests Right (+/-) Left (+/-) IRLS     Active Compression   Labral Tests Right (+/-) Left (+/-)   Crossbody Adduction   Biceps Load Test II     AC Resisted Extension   Jerk Test     GH Instability Tests Right (+/-) Left (+/-) Leny Test     Sulcus Sign - - Biceps Tests Right (+/-) Left (+/-)   Apprehension - - Speed     Relocation   Yergason s - -   Other:   Other:     Other:   Other:       Palpation: Moderate/severe TTP L , Mild/moderate TTP R anterior shoulder along proximal biceps tendons, pec minor.  Moderate L, Mild R GH joint hypomobility noted.    Treatment Today     TREATMENT MINUTES COMMENTS   Evaluation 20 Discussed therapy diagnosis, prognosis, POC, relevant anatomy and basis for tx.   Self-care/ Home management     Manual therapy 10 Supine CFM to proximal biceps tendons, with instructions on performance for home x2-3 min daily.  Supine L and R GH posterior glides grade II>III.   Neuromuscular Re-education     Therapeutic Activity     Therapeutic Exercises 30 Exercises:  Exercise #1: Doorway pec, bicep stretches: 2x30 sec each  Comment #1: Scalene stretch: 2x30 sec B  Exercise #2: SL sleeper stretch: 2x30 sec B  Comment #2: Scap retraction: 10x3 sec >> Prone Is: 10x3 sec  Exercise #3: TB ER: add next visit     Gait training     Modality__________________                Total 60    Blank areas are intentional and mean the treatment did not include these items.            PT Evaluation Code: (Please list factors)  Patient History/Comorbidities: chronicity  Examination: Bilat shoulder pain  Clinical Presentation: Stable  Clinical Decision Making: Low    Patient History/  Comorbidities Examination  (body structures and functions, activity limitations, and/or participation restrictions) Clinical Presentation Clinical Decision Making (Complexity)   No documented Comorbidities or personal factors 1-2 Elements  Stable and/or uncomplicated Low   1-2 documented comorbidities or personal factor 3 Elements Evolving clinical presentation with changing characteristics Moderate   3-4 documented comorbidities or personal factors 4 or more Unstable and unpredictable High           Benjamin Jacome  12/4/2020  2:51 PM

## 2021-06-13 NOTE — PROGRESS NOTES
[facility] Daily Progress     Patient Name: Delma Bobo  Date: 12/22/2020  Visit #: 4  PTA visit #:    PRECAUTIONS: none  Referral Diagnosis: Left shoulder pain, unspecified chronicity  Referring provider: Helen Leone MD  Visit Diagnosis:     ICD-10-CM    1. Chronic pain of both shoulders  M25.511     G89.29     M25.512    2. Decreased ROM of left shoulder  M25.612    3. Decreased ROM of right shoulder  M25.611    4. Impingement syndrome of left shoulder  M75.42    5. Impingement syndrome of right shoulder  M75.41          Assessment:     Thus far, patient finding exercises to be helpful in reducing pain and improving neck and bilat shoulder functional ROM.  Reports decreased pain and stiffness post MT.    HEP/POC compliance is  good .  Patient is benefitting from skilled physical therapy and is making steady progress toward functional goals.  Patient is appropriate to continue with skilled physical therapy intervention, as indicated by initial plan of care.    Goal Status: PROGRESSING  Pt. will be independent with home exercise program in : 6 weeks  Pt. will have improved quality of sleep: Comment  Comment:: sleeping without interruptions from pain in 8 weeks.    Patient will decrease : SPADI score;by _ points;for improved quality of life;in 6 weeks  by ___ points: >= 10  Pt will: be able to reach overhead, behind for bathing, dressing, and daily household chores with pain <= 1/10 in 12 weeks.  Pt will: be able to lift/carry laundry, young kids with pain <= 1/10 in 12 weeks.      Plan / Patient Education:     Follow-up on use of towel roll at night, as well as ergonomic set-up at home.  Review OTB wall walks for correct performance and add prone Ts as tolerated.  Continue MT per below.    Subjective:     Pain Rating: Did not rate, L>R shoulder  Not too much new to report since last visit. R shoulder is a little more sore this AM. Noting both sides of her neck have been feeling tight and  stiff.  Will be setting up new chair for work at home soon, but is off until Jan. 4.  Overall, does report decreased pain when she performs the exercises.    Objective:     Educated on use of cervical towel roll for support at night to help decrease pain.  Mild TTP present L anterior shoulder along pec minor, proximal biceps tendons.  Mild/mod L GH joint hypomobility present.  Reports good relief of pain and tightness with press and stretch to 1st rib, L>R.    Exercises:  Exercise #1: Doorway pec, bicep stretches: HEP  Comment #1: Scalene stretch: HEP  Exercise #2: SL sleeper stretch: HEP  Comment #2: Scap retraction >> Prone Is: HEP  Exercise #3: TB ER: HEP with OTB  Comment #3: L (and R) upper trap/levator scap stretch: HEP  Exercise #4: TB lateral wall walks: verbal review (OTB)  Comment #4: Chin tucks: 15x3 sec supine  Exercise #5: Foam rolling vertically to thoracic spine, plus segmental extension x10-15 reps over upper/mid T spine    Treatment Today     TREATMENT MINUTES COMMENTS   Evaluation     Self-care/ Home management     Manual therapy 25 Supine CFM to L proximal biceps tendons  Supine L GH posterior, inferior glides grade II>III  Supine cervical distraction, L and R upper trap stretches   Seated press and sretch to L/R levator scap x5, to 1st rib with active cerv ROT/lateral sideband x5 bilat   Neuromuscular Re-education     Therapeutic Activity     Therapeutic Exercises 9 Ex per flow sheet   Gait training     Modality__________________                Total 34    Blank areas are intentional and mean the treatment did not include these items.       Benjamin Jacome  12/22/2020

## 2021-06-13 NOTE — PROGRESS NOTES
[facility] Daily Progress     Patient Name: Delma Bobo  Date: 12/11/2020  Visit #: 2  PTA visit #:    PRECAUTIONS: none  Referral Diagnosis: Left shoulder pain, unspecified chronicity  Referring provider: Helen Leone MD  Visit Diagnosis:     ICD-10-CM    1. Chronic pain of both shoulders  M25.511     G89.29     M25.512    2. Decreased ROM of left shoulder  M25.612    3. Decreased ROM of right shoulder  M25.611    4. Impingement syndrome of left shoulder  M75.42    5. Impingement syndrome of right shoulder  M75.41          Assessment:     Thus far, patient finding exercises to be helpful in reducing pain, at least short-term.   Demonstrates improved cervical AROM t/o, minus flexion, from previous visit.    HEP/POC compliance is  good .  Patient is benefitting from skilled physical therapy and is making steady progress toward functional goals.  Patient is appropriate to continue with skilled physical therapy intervention, as indicated by initial plan of care.    Goal Status:  Pt. will be independent with home exercise program in : 6 weeks  Pt. will have improved quality of sleep: Comment  Comment:: sleeping without interruptions from pain in 8 weeks.    Patient will decrease : SPADI score;by _ points;for improved quality of life;in 6 weeks  by ___ points: >= 10  Pt will: be able to reach overhead, behind for bathing, dressing, and daily household chores with pain <= 1/10 in 12 weeks.  Pt will: be able to lift/carry laundry, young kids with pain <= 1/10 in 12 weeks.      Plan / Patient Education:     Review TB ER for correct performance and add prone Ts as tolerated.  Continue MT per below.    Subjective:     Pain Rating: Did not rate, L>R shoulder  Not as good as last week. Possibly relates to change in cupping method done when she went this past Monday.   in the front of the shoulder, worse with reaching in front and overhead. Sometimes feels it in the back (points to L upper trap,  levator scap area). Has been using her foam roller and a massage ball to the upper back, which does help.  Still with difficulty lying on either side for extended periods.  Doing HEP regularly. Denies pain with ex, and reports stretches feel good- noticing less pain after she does them.    Objective:     Reviewed HEP to ensure correct performance of ex, but otherwise, patient with excellent technique.  Notes greatest relief of pain and tightness with manual stretch to L upper trap, with significantly improved R ROT and SB compared to previous session.    Exercises:  Exercise #1: Doorway pec, bicep stretches: 2x30 sec each  Comment #1: Scalene stretch: 2x30 sec B  Exercise #2: SL sleeper stretch: 2x30 sec B  Comment #2: Scap retraction: 10x3 sec >> Prone Is: 10x3 sec  Exercise #3: TB ER: 10x with OTB  Comment #3: L (and R) upper trap/levator scap stretch: 3x20-30 sec    Treatment Today     TREATMENT MINUTES COMMENTS   Evaluation     Self-care/ Home management     Manual therapy 23 Supine CFM to bilat proximal biceps tendons  Supine L and R GH posterior, inferior glides grade II>III  Supine L and R upper trap stretches x30-60 sec holds   Neuromuscular Re-education     Therapeutic Activity     Therapeutic Exercises 14 Ex per flow sheet  UBE 3 min F   Gait training     Modality__________________                Total 37    Blank areas are intentional and mean the treatment did not include these items.       Benjamin Jacome  12/11/2020

## 2021-06-13 NOTE — PROGRESS NOTES
[facility] Daily Progress     Patient Name: Delma Bobo  Date: 12/18/2020  Visit #: 3  PTA visit #:    PRECAUTIONS: none  Referral Diagnosis: Left shoulder pain, unspecified chronicity  Referring provider: Helen Leone MD  Visit Diagnosis:     ICD-10-CM    1. Chronic pain of both shoulders  M25.511     G89.29     M25.512    2. Decreased ROM of left shoulder  M25.612    3. Decreased ROM of right shoulder  M25.611    4. Impingement syndrome of left shoulder  M75.42    5. Impingement syndrome of right shoulder  M75.41          Assessment:     Thus far, patient finding exercises to be helpful in reducing pain and improving neck and bilat shoulder functional ROM.  Reports decreased pain and stiffness post MT.    HEP/POC compliance is  good .  Patient is benefitting from skilled physical therapy and is making steady progress toward functional goals.  Patient is appropriate to continue with skilled physical therapy intervention, as indicated by initial plan of care.    Goal Status: PROGRESSING  Pt. will be independent with home exercise program in : 6 weeks  Pt. will have improved quality of sleep: Comment  Comment:: sleeping without interruptions from pain in 8 weeks.    Patient will decrease : SPADI score;by _ points;for improved quality of life;in 6 weeks  by ___ points: >= 10  Pt will: be able to reach overhead, behind for bathing, dressing, and daily household chores with pain <= 1/10 in 12 weeks.  Pt will: be able to lift/carry laundry, young kids with pain <= 1/10 in 12 weeks.      Plan / Patient Education:     Review OTB wall walks for correct performance and add prone Ts as tolerated.  Continue MT per below, adding CFM to L distal supraspinatus tendon.    Subjective:     Pain Rating: Did not rate, L>R shoulder  Reports she has had a pretty good week this past week. Slept more on the L side last night, which she is not used to, and definitely noticing more soreness this AM.  Finding stretches  to be really helpful, especially the new neck stretch.  R shoulder hardly bothering her; majority of the pain is in the front and top of the L shoulder (supraspinatus, pec and proximal biceps).  Trying not to lift her kids that much, and having  help with carrying the laundry.    Objective:     Bilat shoulder AROM with mild loss t/o, with mild ERP t/o on L (Thumb to T8 IR bilat, with pain noted on L).  Very minimal TTP present L anterior shoulder along pec minor, proximal biceps tendons.  Mild/mod L GH joint hypomobility present.  Cervical AROM WNL t/o post MT, minus mild loss of flexion.    Exercises:  Exercise #1: Doorway pec, bicep stretches: HEP  Comment #1: Scalene stretch: HEP  Exercise #2: SL sleeper stretch: HEP  Comment #2: Scap retraction: 5x3 sec >> Prone Is: HEP  Exercise #3: TB ER: 15x with OTB  Comment #3: L (and R) upper trap/levator scap stretch: x20 sec B  Exercise #4: TB lateral wall walks: 15x with OTB    Treatment Today     TREATMENT MINUTES COMMENTS   Evaluation     Self-care/ Home management     Manual therapy 23 Supine CFM to L proximal biceps tendons  Supine L GH posterior, inferior glides grade II>III  Supine cervical distraction, L and R upper trap stretches x30-60 sec holds   Neuromuscular Re-education     Therapeutic Activity     Therapeutic Exercises 8 Ex per flow sheet   Gait training     Modality__________________                Total 37    Blank areas are intentional and mean the treatment did not include these items.       Benjamin Jacome  12/18/2020

## 2021-06-14 NOTE — PROGRESS NOTES
[facility] Daily Progress     Patient Name: Delma Bobo  Date: 1/5/2021  Visit #: 6  PTA visit #:    PRECAUTIONS: none  Referral Diagnosis: Left shoulder pain, unspecified chronicity  Referring provider: Helen Leone MD  Visit Diagnosis:     ICD-10-CM    1. Chronic pain of both shoulders  M25.511     G89.29     M25.512    2. Decreased ROM of left shoulder  M25.612    3. Decreased ROM of right shoulder  M25.611    4. Impingement syndrome of left shoulder  M75.42    5. Impingement syndrome of right shoulder  M75.41          Assessment:     Thus far, patient finding exercises and MT to be helpful in reducing pain and improving neck and bilat shoulder functional ROM.  Reports decreased pain and stiffness post MT.    HEP/POC compliance is  good .  Patient is benefitting from skilled physical therapy and is making steady progress toward functional goals.  Patient is appropriate to continue with skilled physical therapy intervention, as indicated by initial plan of care.    Goal Status: PROGRESSING  Pt. will be independent with home exercise program in : 6 weeks  Pt. will have improved quality of sleep: Comment  Comment:: sleeping without interruptions from pain in 8 weeks. MET  Patient will decrease : SPADI score;by _ points;for improved quality of life;in 6 weeks  by ___ points: >= 10  Pt will: be able to reach overhead, behind for bathing, dressing, and daily household chores with pain <= 1/10 in 12 weeks. MET  Pt will: be able to lift/carry laundry, young kids with pain <= 1/10 in 12 weeks. PROGRESSING    Plan / Patient Education:     Review median nerve tensioners. Consider forward and lateral modified planks to progress strengthening.  Continue MT per below.    Subjective:     Pain Rating: Did not rate  Went to acupuncture Wed and chiropractor on Thurs as well as yesterday (mainly for the neck), and does seem to be helpful. Feeling like she can move her neck more normally, and the exercises feel  better to do. Thinking to keep weekly appts with these, in addition to PT.  Sleep has been good: not really waking due to pain and able to lie on the L side without much issue.  Has also been able to reach for donning/doffing clothing without much pain or issue. Still painful if lifting heavier items, such as her toddlers, but not as intense.    Objective:     (+) median nerve tension present, L>R. Added median nerve tensioners to HEP.  Mild/moderate hypomobility noted with central PAs to upper thoracic spine.  Very minimal TTP present L anterior shoulder along pec minor, proximal biceps tendons.  Mild L GH joint hypomobility present.  L shoulder flexion PROM WFL, but with mild ERP.    Exercises:  Exercise #1: Doorway pec, bicep stretches: HEP  Comment #1: Scalene stretch: HEP  Exercise #2: SL sleeper stretch: HEP  Comment #2: Scap retraction >> Prone Is: 5x, Ts 10x3 sec  Exercise #3: TB ER: HEP with OTB  Comment #3: L (and R) upper trap/levator scap stretch: HEP  Exercise #4: TB lateral wall walks: HEP with GTB  Comment #4: Chin tucks: verbal review- supine and seated  Exercise #5: Foam rolling vertically to thoracic spine, plus segmental extension over upper/mid T spine: x1 min each    Treatment Today     TREATMENT MINUTES COMMENTS   Evaluation     Self-care/ Home management     Manual therapy 24 Supine CFM to L proximal biceps tendons  Supine L GH posterior, inferior glides grade II>III  Supine cervical distraction, L and R upper trap stretches   Prone central PAs to T1-T4, grade III   Neuromuscular Re-education     Therapeutic Activity     Therapeutic Exercises 16 Ex per flow sheet  UBE 4.5 min F   Gait training     Modality__________________                Total 40    Blank areas are intentional and mean the treatment did not include these items.       Benjamin Ayaz  1/5/2021

## 2021-06-14 NOTE — PROGRESS NOTES
[facility] Daily Progress     Patient Name: Delma Bobo  Date: 1/15/2021  Visit #: 7  PTA visit #:    PRECAUTIONS: none  Referral Diagnosis: Left shoulder pain, unspecified chronicity  Referring provider: Helen Leone MD  Visit Diagnosis:     ICD-10-CM    1. Chronic pain of both shoulders  M25.511     G89.29     M25.512    2. Decreased ROM of left shoulder  M25.612    3. Decreased ROM of right shoulder  M25.611    4. Impingement syndrome of left shoulder  M75.42    5. Impingement syndrome of right shoulder  M75.41          Assessment:     Thus far, patient finding exercises and MT to be helpful in reducing pain and improving neck and bilat shoulder functional ROM. She is demonstrating excellent progression towards goals, with most nearing to met. (+) AC joint reproduction today, which would explain the pinching sensation she gets with crossbody abduction; improved with manual and self-mobilization. Will plan to continue to progress scapular and rotator cuff strengthening as tolerated for greater ease and less pain with lifting.    HEP/POC compliance is  good .  Patient is benefitting from skilled physical therapy and is making steady progress toward functional goals.  Patient is appropriate to continue with skilled physical therapy intervention, as indicated by initial plan of care.    Goal Status: PROGRESSING  Pt. will be independent with home exercise program in : 6 weeks  Pt. will have improved quality of sleep: Comment  Comment:: sleeping without interruptions from pain in 8 weeks. MET  Patient will decrease : SPADI score;by _ points;for improved quality of life;in 6 weeks  by ___ points: >= 10  Pt will: be able to reach overhead, behind for bathing, dressing, and daily household chores with pain <= 1/10 in 12 weeks. MET  Pt will: be able to lift/carry laundry, young kids with pain <= 1/10 in 12 weeks. PROGRESSING    Plan / Patient Education:     Recheck pain with crossbody ABD, and  continue manual and self (L) AC joint mobilizations as appropriate.  Trial forward and lateral planks (modified as appropriate).    Subjective:     Pain Rating: Did not rate  Has been able to do more lifting. Crossbody reaching still creates some pinching from time to time.  A little bit more flared up today after poor sleep last night in kiddos room, but overall, continuing to feel well since last visit. Feels pain to be steadily becoming more under control. Will see how it fares as she works back up to her usual 40 hours/week.    Objective:     Mild hypomobility noted with central PAs to upper thoracic spine.  Very minimal TTP present L anterior shoulder proximal biceps and subscapularis tendons.  Mild L GH joint hypomobility present; improved with mobilization.  (+) active compression/AC joint lesion test. Post manual and self-AC joint mobilizations, patient reports significantly reduced pain with crossbody ABD and active compression testing. Added to HEP.    Exercises:  Exercise #1: Doorway pec, bicep stretches: HEP  Comment #1: Scalene stretch: HEP  Exercise #2: SL sleeper stretch: HEP  Comment #2: Scap retraction >> Prone Is and Ts: HEP  Exercise #3: TB ER: HEP with OTB  Comment #3: L (and R) upper trap/levator scap stretch: HEP  Exercise #4: TB lateral wall walks: HEP with GTB  Comment #4: Chin tucks: HEP  Exercise #5: Foam rolling vertically to thoracic spine, plus segmental extension over upper/mid T spine: HEP  Comment #5: Self L AC joint distraction: 3x30 sec, and inferior mobilization with towel across L clavicle: 3x30 sec  Exercise #6: Median nerve tensioners: 15x B  Comment #6: Planks: add next visit    Treatment Today     TREATMENT MINUTES COMMENTS   Evaluation     Self-care/ Home management     Manual therapy 23 Seated L AC joint posterior glides grade II>III, followed by distraction grade II>III  Supine CFM to L proximal biceps tendons  Supine L GH posterior glides grade III  Supine cervical  distraction, L and R upper trap stretches   Prone central PAs to T1-T4, grade III   Neuromuscular Re-education     Therapeutic Activity     Therapeutic Exercises 23 Ex per flow sheet  UBE 3 min F warm up   Gait training     Modality__________________                Total 46    Blank areas are intentional and mean the treatment did not include these items.       Benjamin Jacome  1/15/2021

## 2021-06-14 NOTE — PROGRESS NOTES
[facility] Daily Progress     Patient Name: Delma Bobo  Date: 12/29/2020  Visit #: 5  PTA visit #:    PRECAUTIONS: none  Referral Diagnosis: Left shoulder pain, unspecified chronicity  Referring provider: Helen Leone MD  Visit Diagnosis:     ICD-10-CM    1. Chronic pain of both shoulders  M25.511     G89.29     M25.512    2. Decreased ROM of left shoulder  M25.612    3. Decreased ROM of right shoulder  M25.611    4. Impingement syndrome of left shoulder  M75.42    5. Impingement syndrome of right shoulder  M75.41          Assessment:     Thus far, patient finding exercises to be helpful in reducing pain and improving neck and bilat shoulder functional ROM.  Reports decreased pain and stiffness post MT.    HEP/POC compliance is  good .  Patient is benefitting from skilled physical therapy and is making steady progress toward functional goals.  Patient is appropriate to continue with skilled physical therapy intervention, as indicated by initial plan of care.    Goal Status: PROGRESSING  Pt. will be independent with home exercise program in : 6 weeks  Pt. will have improved quality of sleep: Comment  Comment:: sleeping without interruptions from pain in 8 weeks.    Patient will decrease : SPADI score;by _ points;for improved quality of life;in 6 weeks  by ___ points: >= 10  Pt will: be able to reach overhead, behind for bathing, dressing, and daily household chores with pain <= 1/10 in 12 weeks.  Pt will: be able to lift/carry laundry, young kids with pain <= 1/10 in 12 weeks.      Plan / Patient Education:     Review prone Ts and foam roller to upper/mid thoracic spine.  Trial median nerve tensioners.  Continue MT per below.    Subjective:     Pain Rating: Did not rate  Reports pain kind of comes and goes in waves. Had a stretch of a couple days where she was having more pain in the neck and upper back, traveling down into the L anterior shoulder, making it feel weak.  She does note she is  longer getting the pain into the lateral upper arm, and no longer with paresthesias into the arms/hands at night.  Has been using the cervical towel roll at night, which she has found helpful, but she questions if she needs a new pillow as well.    Objective:     Very minimal TTP present L anterior shoulder along pec minor, proximal biceps tendons.  Mild L GH joint hypomobility present.  L shoulder ER/IR PROM WNL.    Exercises:  Exercise #1: Doorway pec, bicep stretches: HEP  Comment #1: Scalene stretch: x20 sec on L  Exercise #2: SL sleeper stretch: HEP  Comment #2: Scap retraction >> Prone Is: 3x, Ts 10x3 sec  Exercise #3: TB ER: HEP with OTB  Comment #3: L (and R) upper trap/levator scap stretch: HEP  Exercise #4: TB lateral wall walks: 15x with GTB  Comment #4: Chin tucks: verbal review- supine and seated  Exercise #5: Foam rolling vertically to thoracic spine, plus segmental extension over upper/mid T spine: verbal review    Treatment Today     TREATMENT MINUTES COMMENTS   Evaluation     Self-care/ Home management     Manual therapy 18 Supine CFM to L proximal biceps tendons  Supine L GH posterior, inferior glides grade II>III  Supine cervical distraction, L and R upper trap stretches   Seated press and stretch to L/R 1st rib with active cerv ROT/lateral sideband x5 bilat   Neuromuscular Re-education     Therapeutic Activity     Therapeutic Exercises 12 Ex per flow sheet   Gait training     Modality__________________                Total 30    Blank areas are intentional and mean the treatment did not include these items.       Benjamin Jacome  12/29/2020

## 2021-06-14 NOTE — PROGRESS NOTES
[facility] Daily Progress     Patient Name: Delma Bobo  Date: 1/22/2021  Visit #: 8  PTA visit #:    PRECAUTIONS: none  Referral Diagnosis: Left shoulder pain, unspecified chronicity  Referring provider: Helen Leone MD  Visit Diagnosis:     ICD-10-CM    1. Chronic pain of both shoulders  M25.511     G89.29     M25.512    2. Decreased ROM of left shoulder  M25.612    3. Decreased ROM of right shoulder  M25.611    4. Impingement syndrome of left shoulder  M75.42    5. Impingement syndrome of right shoulder  M75.41          Assessment:     Thus far, patient finding exercises and MT to be helpful in reducing pain and improving neck and bilat shoulder functional ROM. She is demonstrating excellent progression towards goals, with most nearing to met. Previous L AC joint pain improving with self and therapist mobilizations. Will plan to continue to progress scapular and rotator cuff strengthening as tolerated for greater ease and less pain with lifting.    HEP/POC compliance is  good .  Patient is benefitting from skilled physical therapy and is making steady progress toward functional goals.  Patient is appropriate to continue with skilled physical therapy intervention, as indicated by initial plan of care.    Goal Status: PROGRESSING  Pt. will be independent with home exercise program in : 6 weeks  Pt. will have improved quality of sleep: Comment  Comment:: sleeping without interruptions from pain in 8 weeks. MET  Patient will decrease : SPADI score;by _ points;for improved quality of life;in 6 weeks  by ___ points: >= 10  Pt will: be able to reach overhead, behind for bathing, dressing, and daily household chores with pain <= 1/10 in 12 weeks. MET  Pt will: be able to lift/carry laundry, young kids with pain <= 1/10 in 12 weeks. PROGRESSING    Plan / Patient Education:     Reassess SPADI.  Update HEP to include foam roller ex, doorway pec and bicep stretches, prone Is/Ts/Ws, GTB wall walks,  seated chin tucks, and scalene stretches (AC joint mobilizations as needed).  Continue MT as appropriate.    Subjective:     Pain Rating: Did not rate  Continuing to do really quite well. Notes this has been another good week, even with getting back to working at her computer more.  Notes less intense pain and can reach further prior to pain with crossbody reaching now this week.  No pain with bathing, dressing, reaching overhead to put dishes away.  When carrying her younger kids, has usually been able to do this without pain now. She does have to be careful when lifting them up onto the toilet, as she is not able to hold them as close to her body, and this can be irritating to her shoulder.    Objective:     Mild hypomobility noted with central PAs to upper thoracic spine.  Very minimal TTP present L anterior shoulder proximal biceps and subscapularis tendons.  Mild L GH joint hypomobility present; improved with mobilization.    Exercises:  Exercise #1: Doorway pec, bicep stretches: HEP  Comment #1: Scalene stretch: HEP  Exercise #2: SL sleeper stretch: HEP  Comment #2: Scap retraction >> Prone Is and Ts: HEP  Exercise #3: TB ER: HEP with OTB  Comment #3: L (and R) upper trap/levator scap stretch: HEP  Exercise #4: TB lateral wall walks: HEP with GTB  Comment #4: Chin tucks: HEP  Exercise #5: Foam rolling vertically to thoracic spine, plus segmental extension over upper/mid T spine: HEP  Comment #5: Self L AC joint distraction: 2x30 sec, and inferior mobilization with towel across L clavicle: 2x30 sec  Exercise #6: Median nerve tensioners: HEP  Comment #6: Planks: add next visit    Treatment Today     TREATMENT MINUTES COMMENTS   Evaluation     Self-care/ Home management     Manual therapy 23 Seated L AC joint posterior, anterior glides grade II>III, followed by distraction grade II>III  Supine CFM to L proximal biceps tendons  Supine L GH posterior, inferior glides grade III  Supine cervical distraction  Prone  central PAs to T1-T4, grade III   Neuromuscular Re-education     Therapeutic Activity     Therapeutic Exercises 14 Ex per flow sheet  UBE 3 min F warm up   Gait training     Modality__________________                Total 37    Blank areas are intentional and mean the treatment did not include these items.       Benjamin Jacome  1/22/2021

## 2021-06-14 NOTE — PROGRESS NOTES
[facility] Daily Progress     Patient Name: Delma Bobo  Date: 1/29/2021  Visit #: 9  PTA visit #:    PRECAUTIONS: none  Referral Diagnosis: Left shoulder pain, unspecified chronicity  Referring provider: Helen Leone MD  Visit Diagnosis:     ICD-10-CM    1. Chronic pain of both shoulders  M25.511     G89.29     M25.512    2. Decreased ROM of left shoulder  M25.612    3. Decreased ROM of right shoulder  M25.611    4. Impingement syndrome of left shoulder  M75.42    5. Impingement syndrome of right shoulder  M75.41          Assessment:     Pt is demonstrating significantly improved pain, ROM, and functional strength from start of care, with goals nearing to met. Plan to follow-up next week to review newly added prone Ws and PPT, with likely discharge to HEP.    HEP/POC compliance is  good .  Patient is benefitting from skilled physical therapy and is making steady progress toward functional goals.  Patient is appropriate to continue with skilled physical therapy intervention, as indicated by initial plan of care.    Goal Status:   Pt. will be independent with home exercise program in : 6 weeks. PROGRESSING  Pt. will have improved quality of sleep: Comment  Comment:: sleeping without interruptions from pain in 8 weeks. MET  Patient will decrease : SPADI score;by _ points;for improved quality of life;in 6 weeks  by ___ points: >= 10. MET  Pt will: be able to reach overhead, behind for bathing, dressing, and daily household chores with pain <= 1/10 in 12 weeks. MET  Pt will: be able to lift/carry laundry, young kids with pain <= 1/10 in 12 weeks. PROGRESSING    Plan / Patient Education:     Review prone Ws and PPT- progressing as able.  Continue MT as appropriate.    Subjective:     Pain Rating: Did not rate  Continuing to do really quite well. Notes this has been another good week, even with getting back to working at her computer more.  L shoulder pinching with crossbody reaching is much better;  not always there and significantly less intense.  Does note some R periscapular pain when she is mousing, primarily when sitting. Has been trying to stand more at her sit<>stand desk, but limited by aching in low back and legs.  Lifting her kids is continuing to get better.    Objective:     SPADI: 8% - significantly improved from 73% on eval.  Reviewed and updated HEP for patient to continue with, as she is nearing to meeting all goals. Added PPT for core stabilization to help improve standing tolerance to help reduce periscapular pain.    Exercises:  Exercise #1: Doorway pec, bicep stretches: HEP  Comment #1: Scalene stretch: HEP  Exercise #2: SL sleeper stretch: HEP  Comment #2: Scap retraction >> Prone Is and Ts: HEP  Exercise #3: TB ER: HEP with OTB  Comment #3: L (and R) upper trap/levator scap stretch: HEP  Exercise #4: TB lateral wall walks: HEP with GTB  Comment #4: Chin tucks: HEP  Exercise #5: Foam rolling vertically to thoracic spine, plus segmental extension over upper/mid T spine: HEP  Comment #5: Self L AC joint distraction: 2x30 sec, and inferior mobilization with towel across L clavicle: 2x30 sec  Exercise #6: Median nerve tensioners: HEP  Comment #6: Planks: add next visit    Treatment Today     TREATMENT MINUTES COMMENTS   Evaluation     Self-care/ Home management     Manual therapy  Seated L AC joint posterior, anterior glides grade II>III, followed by distraction grade II>III  Supine CFM to L proximal biceps tendons  Supine L GH posterior, inferior glides grade III  Supine cervical distraction  Prone central PAs to T1-T4, grade III  *not performed today   Neuromuscular Re-education     Therapeutic Activity     Therapeutic Exercises 43 Ex per flow sheet  UBE 3 min F warm up   Gait training     Modality__________________                Total 43    Blank areas are intentional and mean the treatment did not include these items.       Benjamin Ayaz  1/29/2021

## 2021-06-15 NOTE — PROGRESS NOTES
"[facility] Daily Progress     Patient Name: Delma Bobo  Date: 2/5/2021  Visit #: 10  PTA visit #:    PRECAUTIONS: none  Referral Diagnosis: Left shoulder pain, unspecified chronicity  Referring provider: Helen Leone MD  Visit Diagnosis:     ICD-10-CM    1. Chronic pain of both shoulders  M25.511     G89.29     M25.512    2. Decreased ROM of left shoulder  M25.612    3. Decreased ROM of right shoulder  M25.611    4. Impingement syndrome of left shoulder  M75.42    5. Impingement syndrome of right shoulder  M75.41          Assessment:     Pt is demonstrating significantly improved pain, ROM, and functional strength from start of care, with goals nearing to met. With increased time spent at the computer this past week, she does notice slight increase in pain compared to the past several weeks, \"but not nearly as bad as where it was.\" Pt reassured that while pain may fluctuate, she is continuing to do quite well and has the necessary tools to manage her pain.    HEP/POC compliance is  good .  Patient is benefitting from skilled physical therapy and is making steady progress toward functional goals.  Patient is appropriate to continue with skilled physical therapy intervention, as indicated by initial plan of care.    Goal Status:   Pt. will be independent with home exercise program in : 6 weeks. PROGRESSING  Pt. will have improved quality of sleep: Comment  Comment:: sleeping without interruptions from pain in 8 weeks. MET  Patient will decrease : SPADI score;by _ points;for improved quality of life;in 6 weeks  by ___ points: >= 10. MET  Pt will: be able to reach overhead, behind for bathing, dressing, and daily household chores with pain <= 1/10 in 12 weeks. MET  Pt will: be able to lift/carry laundry, young kids with pain <= 1/10 in 12 weeks. PROGRESSING    Plan / Patient Education:     Review PPT with marches and alternating LE extension.  Continue MT as appropriate.    Subjective:     Pain " Rating: Did not rate  Noticing a bit more soreness in R shoulder blade and side of L upper arm this week, which she relates likely to increased time spent sitting in front of the computer for work. She does note this is less intense when she is standing. Previous low back discomfort and leg cramping is better with the addition of PPT exercise, but standing tolerance remains limited.    Objective:     Added PPT with marches and alternating LE extension to further progress PPT/core stability to help improve standing tolerance to help decrease upper quarter sx associated with working at computer.  L shoulder flexion, ABD, and IR PROM with mild loss and ERP t/o; improved ROM and decreased pain noted post MT.    Exercises:  Exercise #1: Doorway pec, bicep stretches: HEP  Comment #1: Scalene stretch: HEP  Exercise #2: SL sleeper stretch: HEP  Comment #2: Scap retraction >> Prone Is and Ts: HEP, Prone Ws: 10x3 sec  Exercise #3: TB ER: HEP with OTB  Comment #3: L (and R) upper trap/levator scap stretch: HEP  Exercise #4: TB lateral wall walks: HEP with GTB  Comment #4: Chin tucks: HEP  Exercise #5: Foam rolling vertically to thoracic spine, plus segmental extension over upper/mid T spine: HEP  Comment #5: Self L AC joint distraction: HEP  Exercise #6: Median nerve tensioners: HEP  Comment #6: PPT: 5x5 sec, PPT with marches: 3x20 reps B, PPT with alternating LE extension: 10x3 sec B    Treatment Today     TREATMENT MINUTES COMMENTS   Evaluation     Self-care/ Home management     Manual therapy 8 Supine L GH posterior, inferior glides grade III   Neuromuscular Re-education     Therapeutic Activity     Therapeutic Exercises 20 Ex per flow sheet  UBE 3 min F warm up   Gait training     Modality__________________                Total 28    Blank areas are intentional and mean the treatment did not include these items.       Benjamin Ayaz  2/5/2021

## 2021-06-15 NOTE — PROGRESS NOTES
[facility] Daily Progress     Patient Name: Delma Bobo  Date: 2/12/2021  Visit #: 11  PTA visit #:    PRECAUTIONS: none  Referral Diagnosis: Left shoulder pain, unspecified chronicity  Referring provider: Helen Leone MD  Visit Diagnosis:     ICD-10-CM    1. Chronic pain of both shoulders  M25.511     G89.29     M25.512    2. Decreased ROM of left shoulder  M25.612    3. Decreased ROM of right shoulder  M25.611    4. Impingement syndrome of left shoulder  M75.42    5. Impingement syndrome of right shoulder  M75.41          Assessment:     Pt is demonstrating significantly improved pain, ROM, and functional strength from start of care, with goals nearing to met. At this time, it is most reasonable to transition to independent self-management via HEP.    Goal Status:   Pt. will be independent with home exercise program in : 6 weeks. MET  Pt. will have improved quality of sleep: Comment  Comment:: sleeping without interruptions from pain in 8 weeks. MET  Patient will decrease : SPADI score;by _ points;for improved quality of life;in 6 weeks  by ___ points: >= 10. MET  Pt will: be able to reach overhead, behind for bathing, dressing, and daily household chores with pain <= 1/10 in 12 weeks. MET  Pt will: be able to lift/carry laundry, young kids with pain <= 1/10 in 12 weeks. PROGRESSING    Plan / Patient Education:     Discharge to [facility].    Subjective:     Pain Rating: Did not rate  Notes this week has been fairly good, even with working full time. Bought a self massager at recommendation from massage therapist, and that has really helped with tightness in bilat upper trap areas, even when only using for 1-2 minutes. Has not noticed much of the R periscapular pain the past week, but still with some L shoulder radiating into the upper arm area from time to time.  Does feel core to be getting stronger and able to stand longer at her sit<>stand desk, which does help her ergonomics and make her  neck/back/shoulder feel better.  Lifting/carrying the kids, laundry has gone well. Still some instances where it is more straining, for example, putting them in car seats.    Objective:     Good TA contraction noted with supine core ex progressions without Valsalva.  L shoulder flexion, ABD, and IR PROM with mild loss and ERP t/o; improved ROM and decreased pain noted post MT.    Exercises:  Exercise #1: Doorway pec, bicep stretches: HEP  Comment #1: Scalene stretch: HEP  Exercise #2: SL sleeper stretch:verbal review  Comment #2: Scap retraction >> Prone Is, Ws and Ts: HEP  Exercise #3: TB ER: HEP with OTB  Comment #3: L (and R) upper trap/levator scap stretch: HEP  Exercise #4: TB lateral wall walks: HEP with GTB  Comment #4: Chin tucks: HEP  Exercise #5: Foam rolling vertically to thoracic spine, plus segmental extension over upper/mid T spine: HEP  Comment #5: Self L AC joint distraction: HEP  Exercise #6: Median nerve tensioners: HEP  Comment #6: PPT: 5x5 sec, PPT with marches: 2x20 reps B, PPT with alternating LE extension: 10x3 sec B    Treatment Today     TREATMENT MINUTES COMMENTS   Evaluation     Self-care/ Home management     Manual therapy 14 Supine L GH posterior, inferior glides grade III  Supine cervical distraction, L and R upper trap stretches   Neuromuscular Re-education     Therapeutic Activity     Therapeutic Exercises 15 Ex per flow sheet  UBE 3 min F warm up   Gait training     Modality__________________                Total 29    Blank areas are intentional and mean the treatment did not include these items.       Benjamin Ayaz  2/12/2021     [facility] Discharge Summary  Patient Name: Delma Bobo  Date: 2/12/2021  Referral Diagnosis: [unfilled]  Referring provider: Helen Leone MD  Visit Diagnosis:   1. Chronic pain of both shoulders     2. Decreased ROM of left shoulder     3. Decreased ROM of right shoulder     4. Impingement syndrome of left shoulder     5.  Impingement syndrome of right shoulder         Goals:  Pt. will be independent with home exercise program in : 6 weeks. MET  Pt. will have improved quality of sleep: Comment  Comment:: sleeping without interruptions from pain in 8 weeks. MET  Patient will decrease : SPADI score;by _ points;for improved quality of life;in 6 weeks  by ___ points: >= 10. MET  Pt will: be able to reach overhead, behind for bathing, dressing, and daily household chores with pain <= 1/10 in 12 weeks. MET  Pt will: be able to lift/carry laundry, young kids with pain <= 1/10 in 12 weeks. PROGRESSING    Patient was seen for 11 visits from 12/4/20 to 2/12/21 with 0 missed appointments.  The patient met goals and has demonstrated understanding of and independence in the home program for self-care, and progression to next steps.  She will initiate contact if questions or concerns arise.  No further therapy is required at this time.    Therapy will be discontinued at this time.  The patient will need a new referral to resume.    Thank you for your referral.  Benjamin Jacome  2/12/2021  10:43 AM

## 2021-06-16 PROBLEM — O36.5932: Status: ACTIVE | Noted: 2017-08-30

## 2021-06-16 PROBLEM — Z34.90 PREGNANCY: Status: ACTIVE | Noted: 2017-08-30

## 2021-06-18 NOTE — PATIENT INSTRUCTIONS - HE
Patient Instructions by Benjamin Jacome PT at 1/15/2021  8:30 AM     Author: Benjamin Jacome PT Service: -- Author Type: Physical Therapist    Filed: 1/15/2021  9:14 AM Encounter Date: 1/15/2021 Status: Signed    : Benjamin Jacome PT (Physical Therapist)       1. Left arm at 90* and wrap the right arm underneath it. Gently pull down and across (grabbing just above the elbow). Hold up to 30 seconds. 2x. 2-3x/day.    2.      Put the sheet/strap over the LEFT shoulder (near where the clavicle connects to the shoulder). Gently pull down and across, and tilt your head to the right. Hold up to 30 seconds. 2 sets. 2-3x/day.

## 2021-06-18 NOTE — PATIENT INSTRUCTIONS - HE
Patient Instructions by Benjamin Jacome PT at 12/11/2020  9:00 AM     Author: Benjamin Jacome PT Service: -- Author Type: Physical Therapist    Filed: 12/11/2020  9:26 AM Encounter Date: 12/11/2020 Status: Signed    : Benjamin Jacome PT (Physical Therapist)        ELASTIC BAND SHOULDER EXTERNAL ROTATION    While holding an elastic band at your side with your elbow bent, start with your hand near your stomach and then pull the band away. Keep your elbow at your side the entire time.    Do 10-15 repetitions. 1x/day.      LEVATOR SCAPULAE STRETCH - MODIFIED    Grasp your arm of the affected side and pull it gently towards the opposite side in front of your body.  Next, tilt your head downward and to the side looking away from the affected side until a stretch is felt.    Hold up to 30 seconds as tolerated, each side.  2x/day.

## 2021-06-18 NOTE — PATIENT INSTRUCTIONS - HE
Patient Instructions by Benjamin Jacome PT at 12/4/2020  8:30 AM     Author: Benjamin Jacome PT Service: -- Author Type: Physical Therapist    Filed: 12/4/2020  9:22 AM Encounter Date: 12/4/2020 Status: Signed    : Benjamin Jacome PT (Physical Therapist)        Pec corner stretch    Find a comfortable position for your hands and lightly lean forward into the corner until you feel a good stretch in your pecs. Hold 20-30 sec, x2 reps.    2x/day.                                     BICEP STRETCH    Place hand on the wall at shoulder height.  Keeping elbow straight, turn your body gently toward the opposite side to feel a stretch in your bicep muscle.      Hold for 30 seconds each arm.  2x/day.      SCALENE STRETCH    Place your hands overlapping on your breast bone. Next, tilt you head upwards and away from the affected side until a gentle stretch is felt along the front and side of your neck.    Hold 15-30 seconds, 2x each side.  2x/day.        SIDELYING INTERNAL ROTATION STRETCH - SLEEPER STRETCH  Start by lying on your side with the affected arm on the bottom.Your affected arm should be bent at the elbow and forearm pointed upwards towards the ceiling as shown.Next, use your unaffected arm to gently draw your affected forearm towards the table or bed.    Hold 30 seconds each arm.  2x/day.            SCAPULAR RETRACTIONS    Draw your shoulder blades back and down.    Hold 3 seconds. 10x.  2x/day.      Prone Scapular Retraction - I's    Lying face-down with your arms straight near your sides, bring your arms up by squeezing your shoulder blades together. Do not let your shoulders ride up to your ears, by engaging the muscles between your shoulder blades.    Hold 3 seconds. 10x.  Every other day (3-4x/week).

## 2021-06-18 NOTE — PATIENT INSTRUCTIONS - HE
"Patient Instructions by Benjamin Jacome PT at 12/22/2020  9:00 AM     Author: Benjamin Jacome PT Service: -- Author Type: Physical Therapist    Filed: 12/22/2020  9:28 AM Encounter Date: 12/22/2020 Status: Signed    : Benjamin Jacome PT (Physical Therapist)        CHIN TUCK - SUPINE    While lying on your back, tuck your chin towards your chest and press the back of your head into the table.    Maintain contact of head with the surface you are lying on the entire time.    Hold 3 seconds. 15x.  2x/day.          THORACIC HORIZONTAL FOAM ROLLER    Place a foam roller under your upper back while sitting on the floor.  Arch your back over the foam roller.  Do not place foam roller across your lower back.    Go back and forth \"reverse crunch.\"  10-15x.    1-2x/day.                "

## 2021-06-18 NOTE — PATIENT INSTRUCTIONS - HE
Patient Instructions by Benjamin Jacome PT at 12/29/2020  9:00 AM     Author: Benjamin Jacome PT Service: -- Author Type: Physical Therapist    Filed: 12/29/2020  9:30 AM Encounter Date: 12/29/2020 Status: Signed    : Benjamin Jacome PT (Physical Therapist)        Prone Scapular Retraction - T's     Lying face-down with your arms straight, bring your arms up by squeezing your shoulder blades together. Do not let your shoulders ride up to your ears, by engaging the muscles between your shoulder blades.    Hold 3 seconds. 10x.  Every other day (3-4x/week).     For you wall walks, switch to the green band instead.

## 2021-06-18 NOTE — PATIENT INSTRUCTIONS - HE
Patient Instructions by Benjamin Jacome PT at 1/5/2021  3:30 PM     Author: Benjamin Jacome PT Service: -- Author Type: Physical Therapist    Filed: 1/5/2021  4:06 PM Encounter Date: 1/5/2021 Status: Signed    : Benjamin Jacome PT (Physical Therapist)         Look at your hand   Extend your arm out to the side, slightly backward and down   As you extend your arm, turn your head away as if looking away from the arm   Get a nice, easy stretch (may get a few tingles...which is OK)   Return the hand to the front of the face     10x each arm. 2-3x/day.

## 2021-06-18 NOTE — PATIENT INSTRUCTIONS - HE
Patient Instructions by Benjamin Jacome PT at 2/5/2021  8:30 AM     Author: Benjamin Jacome PT Service: -- Author Type: Physical Therapist    Filed: 2/5/2021  9:00 AM Encounter Date: 2/5/2021 Status: Signed    : Benjamin Jacome PT (Physical Therapist)        BRACE MARCHING    Perform pelvic tilt (push back down flat).  Keeping back flat, alternate marching, x20-30 reps each leg.    1x/day.    BRACE - SINGLE KNEE EXTENSION    Perform pelvic tilt.  Keeping back flat, alternate kicking one leg out as shown.    Hold 3 seconds. 10x each leg.  1x/day.

## 2021-06-18 NOTE — PATIENT INSTRUCTIONS - HE
Patient Instructions by Benjamin Jacome PT at 12/18/2020  9:00 AM     Author: Benjamin Jacome PT Service: -- Author Type: Physical Therapist    Filed: 12/18/2020  9:28 AM Encounter Date: 12/18/2020 Status: Signed    : Benjamin Jacome PT (Physical Therapist)           THERABAND WALL WALK    With band around your wrists, gently pull out to the left with your left hand. Hold 1 second, then return to center. Repeat on the right side.  10-15x each side. 1x/day.

## 2021-06-18 NOTE — PATIENT INSTRUCTIONS - HE
Patient Instructions by Benjamin Jacome PT at 1/29/2021  8:30 AM     Author: Benjamin Jacome PT Service: -- Author Type: Physical Therapist    Filed: 1/29/2021  9:13 AM Encounter Date: 1/29/2021 Status: Signed    : Benjamin Jacome PT (Physical Therapist)         Pull down and across as you tilt your head to the side and slightly back. Hold 20-30 seconds each side. 1x/day.         THORACIC HORIZONTAL FOAM ROLLER    Place a foam roller under your upper back while sitting on the floor.  Arch your back over the foam roller.  Do not place foam roller across your lower back.    Go at your usual 3 levels.    Also make sure to include rolling up and down along the spine.                                 BICEP STRETCH    Place hand on the wall at shoulder height.  Keeping elbow straight, turn your body gently toward the opposite side to feel a stretch in your bicep muscle.      Hold for 30 seconds each side.  1x/day.      Pec corner stretch    Find a comfortable position for your hands and lightly lean forward into the corner until you feel a good stretch in your pecs. Hold 30 seconds. 1-2 sets.    1x/day.             THERABAND WALL WALK    With band around your wrists, gently pull out to the left with your left hand. Hold 1 second, then return to center. Repeat on the right side.  15x each side. At least 3-4x/week.     Prone Scapular Retraction - I's    Lying face-down with your arms straight near your sides, bring your arms up by squeezing your shoulder blades together. Do not let your shoulders ride up to your ears, by engaging the muscles between your shoulder blades.    Hold 3 seconds. 10x.  3-4x/week.      Prone Scapular Retraction - T's     Lying face-down with your arms straight, bring your arms up by squeezing your shoulder blades together. Do not let your shoulders ride up to your ears, by engaging the muscles between your shoulder blades.    Hold 3 seconds. 10x.  3-4x/week.      Prone Scapular  Retraction -  W's    Lying face-down with your elbows bent and hands near your shoulders, bring your arms up by squeezing your shoulder blades together. Do not let your shoulders ride up to your ears, by engaging the muscles between your shoulder blades.    Hold 3 seconds. 10x.  3-4x/week.      CHIN TUCK - SUPINE    While lying on your back, tuck your chin towards your chest and press the back of your head into the table.    Maintain contact of head with the surface you are lying on the entire time.    Hold 3 seconds. 15x.  1x/day.    RETRACTION / CHIN TUCK    Slowly draw your head back so that your ears line up with your shoulders.    Hold 3 seconds. 10-15x.  1x/day.          PELVIC TILT    While lying on your back, use your stomach muscles to press your spine downwards towards the ground.     Hold 5 seconds. 15x.  1-2x/day.

## 2021-06-19 NOTE — PROGRESS NOTES
Patient is a 37 yo female who presents today with concerns about joint pain.    January and February:Whole bottoms of feet would hurt first thing in the morning and also after prolonged sitting, this started in January.  Would limp the first 4-5 steps and then it would fade.  At the same time, right hand PIP of pinky, middle finger are achey.  PIPs are stiff.  Middle finger swells.  Middle finger PIP hurts when bumping into other fingers.  Pinky finger is better now.    In late March, threw out back picking up [place]. She has known disc disease and went to chiropractor. Started some core exercises and it got better.  Will briefly occasionally feel a sensation of something touching the top of her feet, will look down and nothing is there.   End of April/early May, feet stopped hurting, but more prominent back issues.     [facility] weekend: Right knee: pain with kneeling.  Pain at the lateral insertion of the quad.  Lower medial patella was painful.  Could not bend it all the way.  Felt tight and posterior calf would spasm.  Going up stairs can bother it.      Late June: same thing started on left knee and bottoms of feet starting hurting again.    Delivered 10 months ago her twins.  Stopped breastfeeding 5 months ago.  Has not lost the weight.    Has started cutting out gluten 100% 2 weeks ago.  Cut out most dairy.  Added tart cherry juice, started Vitamin D 2,000, and turmeric.  Started calcium magnesium (500/250).  Certain yoga poses helps the knee issues temporarily.    Hot showers helps the stiffness.  When sleeping, will wake with a numb arm.        ROS:  Const: fatigue, little bit of brain fog that is improving with cutting out gluten.  Rheum: No redness, no heat    Personal Hx:  Some gluten intolerance: prior to delivery would get eczema, more mucous.  Not feeling well.  Started eating gluten during pregnancy.  Gets good sleep.    Lactose intolerance since childhood: reintroduced it during pregnancy.  She has  started cutting it out as well.    Family Hx:  Mother with bad osteoarthritis - her middle finger is the worst and   Paternal uncle with severe osteoarthritis.    Objective     BP 94/54 (Patient Site: Left Arm, Patient Position: Sitting, Cuff Size: Adult Large)  Pulse 74  Wt 191 lb (86.6 kg)  LMP 06/30/2018  SpO2 99%  Breastfeeding? No Comment: Stopped 5 months ago  BMI 31.78 kg/m2  General appearance: alert, appears stated age and cooperative     Hands:    Inspection: Bilateral hand joints without erythema.  PIP joint of right middle finger is swollen.   Palpation: PIP of right middle finger is not tender at the moment, no warmth.  ROM: Normal flexion, extension,  strength, opposition.  Circulation: cap refill < 3 seconds.    Knees: Inspection: No swelling, redness or gross abnormalities.  Palpation: Right knee tenderness at the medial patella and lateral insertion of the quadriceps.  No jointline tenderness.  ROM: normal flexion and extension.  Right patella is hypermobile with extension.  Kenan's negative.  Collateral ligaments: negative.  Anterior drawer sign: negative.  Grind test: negative.    Foot exam:   Inspection: no abnormalities or erythema.  Palpation: Diffuse discomfort along bilateral arches.  ROM: Normal dorsi/plantar flexion.  Pulses 2+ bilaterally.       Delma was seen today for concerns.    Diagnoses and all orders for this visit:    Polyarthritis of hand  -     Rheumatoid Factor Quant  -     Lyme Antibody Cascade  -     Antinuclear Antibody (BERNARDO) Cascade  -     Vitamin D, Total (25-Hydroxy)  -     Erythrocyte Sedimentation Rate  -     CK Total  -     Thyroid Bozrah  Will evaluate for autoimmune vs. systemic arthritides, but likely this is osteoarthritis.  Discussed antiinflammatory supplements, most of which she has started.  Can use warm heat/ice to help with joint pain.      Pain in both feet  Discussed this is likely related to her recent twin pregnancy and delivery.  Patient  has been wearing all of her old shoes.  Discussed stretching exercises.  Recommend getting sized and fitted for newer shoes at [facility] as there is a chance that her shoe size has changed in pregnancy as well.      Patellofemoral pain syndrome of right knee  Discussed stretch and strengthening exercises.  Discussed pathophysiology.  Patient is working on losing her prenatal weight.  If no improvement, can refer to PT.

## 2021-06-27 ENCOUNTER — HEALTH MAINTENANCE LETTER (OUTPATIENT)
Age: 41
End: 2021-06-27

## 2021-10-17 ENCOUNTER — HEALTH MAINTENANCE LETTER (OUTPATIENT)
Age: 41
End: 2021-10-17

## 2022-02-06 ENCOUNTER — HEALTH MAINTENANCE LETTER (OUTPATIENT)
Age: 42
End: 2022-02-06

## 2022-03-09 ENCOUNTER — OFFICE VISIT (OUTPATIENT)
Dept: FAMILY MEDICINE | Facility: CLINIC | Age: 42
End: 2022-03-09
Payer: COMMERCIAL

## 2022-03-09 VITALS
HEIGHT: 66 IN | BODY MASS INDEX: 30.86 KG/M2 | HEART RATE: 68 BPM | DIASTOLIC BLOOD PRESSURE: 60 MMHG | SYSTOLIC BLOOD PRESSURE: 110 MMHG | OXYGEN SATURATION: 100 % | WEIGHT: 192 LBS

## 2022-03-09 DIAGNOSIS — Z13.220 LIPID SCREENING: ICD-10-CM

## 2022-03-09 DIAGNOSIS — L30.1 DYSHIDROTIC DERMATITIS: ICD-10-CM

## 2022-03-09 DIAGNOSIS — Z00.00 ROUTINE GENERAL MEDICAL EXAMINATION AT A HEALTH CARE FACILITY: Primary | ICD-10-CM

## 2022-03-09 DIAGNOSIS — Z13.1 SCREENING FOR DIABETES MELLITUS: ICD-10-CM

## 2022-03-09 DIAGNOSIS — Z12.31 VISIT FOR SCREENING MAMMOGRAM: ICD-10-CM

## 2022-03-09 LAB
CHOLEST SERPL-MCNC: 239 MG/DL
FASTING STATUS PATIENT QL REPORTED: YES
HBA1C MFR BLD: 5.5 % (ref 0–5.6)
HDLC SERPL-MCNC: 45 MG/DL
LDLC SERPL CALC-MCNC: 119 MG/DL
TRIGL SERPL-MCNC: 377 MG/DL

## 2022-03-09 PROCEDURE — 83036 HEMOGLOBIN GLYCOSYLATED A1C: CPT | Performed by: FAMILY MEDICINE

## 2022-03-09 PROCEDURE — 99396 PREV VISIT EST AGE 40-64: CPT | Performed by: FAMILY MEDICINE

## 2022-03-09 PROCEDURE — 80061 LIPID PANEL: CPT | Performed by: FAMILY MEDICINE

## 2022-03-09 PROCEDURE — 36415 COLL VENOUS BLD VENIPUNCTURE: CPT | Performed by: FAMILY MEDICINE

## 2022-03-09 RX ORDER — FLUOCINOLONE ACETONIDE 0.1 MG/G
CREAM TOPICAL 2 TIMES DAILY
Qty: 60 G | Refills: 0 | Status: SHIPPED | OUTPATIENT
Start: 2022-03-09 | End: 2022-11-02

## 2022-03-09 NOTE — PROGRESS NOTES
SUBJECTIVE:   CC: Delma Bobo is an 42 year old woman who presents for preventive health visit.       Patient has been advised of split billing requirements and indicates understanding: Yes  Healthy Habits:     Getting at least 3 servings of Calcium per day:  Yes    Bi-annual eye exam:  NO    Dental care twice a year:  Yes    Sleep apnea or symptoms of sleep apnea:  None    Diet:  Gluten-free/reduced    Frequency of exercise:  1 day/week    Duration of exercise:  Less than 15 minutes    Taking medications regularly:  Yes    PHQ-2 Total Score: 1    Additional concerns today:  Yes      Dyshidrotic eczema: worse with  Weather changes.  On the extensor surfaces of both her hands.  Not itchy yet, but it will be.  And then her skin sloughs.  They just went to [place].  Recommend emmollient hand cream.  Has some seasonal affective disorder that seems like it improved when they were in [place]. Discussed 10,000 LUX SADD lamp for 1 hour indirect qam starting from fall to early spring.  Started Vitamin D 5,000 international unit(s).    Left shoulder is better with PT which helped with ROM.  But every now and then will have tightness and needs a massage.  Left hand  Occasionally will have numbness of the thumb.  Will try some conservative measures first.    Menses: regular - longer cycles.  Tried OCP and started spotting.  Acupuncture was helping.  The week before menses, will noticing more emotionality.  May have had her first hot flash.  Her mother had gone through menopause at 41.  Noticing more disturbed sleep.  More headaches at week 3, same time as emotionality.  Added in calcium and B6.    Sexually Active: with   Contraception: Tevin has a vasectomy  Last Pap Smear: 2019 - normal and negative.  Abnormal Pap: No          Today's PHQ-2 Score:   PHQ-2 ( 1999 [employer]) 3/9/2022   Q1: Little interest or pleasure in doing things 0   Q2: Feeling down, depressed or hopeless 1   PHQ-2 Score 1   Q1: Little  interest or pleasure in doing things Not at all   Q2: Feeling down, depressed or hopeless Several days   PHQ-2 Score 1       Abuse: Current or Past (Physical, Sexual or Emotional) - No  Do you feel safe in your environment? Yes    Have you ever done Advance Care Planning? (For example, a Health Directive, POLST, or a discussion with a medical provider or your loved ones about your wishes): Yes, patient states has an Advance Care Planning document and will bring a copy to the clinic.    Social History     Tobacco Use     Smoking status: Former Smoker     Packs/day: 0.00     Smokeless tobacco: Never Used   Substance Use Topics     Alcohol use: No         Alcohol Use 3/9/2022   Prescreen: >3 drinks/day or >7 drinks/week? No       Reviewed orders with patient.  Reviewed health maintenance and updated orders accordingly - Yes      Breast Cancer Screening:    FHS-7:   Breast CA Risk Assessment (FHS-7) 3/9/2022   Did any of your first-degree relatives have breast or ovarian cancer? Yes   Did any of your relatives have bilateral breast cancer? Unknown   Did any man in your family have breast cancer? Yes   Did any woman in your family have breast and ovarian cancer? Yes   Did any woman in your family have breast cancer before age 50 y? No   Do you have 2 or more relatives with breast and/or ovarian cancer? Yes   Do you have 2 or more relatives with breast and/or bowel cancer? Unknown       Mammogram Screening - Offered annual screening and updated Health Maintenance for mutual plan based on risk factor consideration    Pertinent mammograms are reviewed under the imaging tab.    History of abnormal Pap smear: NO - age 30-65 PAP every 5 years with negative HPV co-testing recommended  PAP / HPV Latest Ref Rng & Units 10/2/2019 6/23/2014   PAP Negative for squamous intraepithelial lesion or malignancy. Negative for squamous intraepithelial lesion or malignancy  Electronically signed by Sabiha Shipman CT (ASCP) on 10/10/2019  at  8:57 AM   Negative for squamous intraepithelial lesion or malignancy  Electronically signed by William Jackson MD PhD on 2014 at  8:34 AM     HPV16 NEG Negative -   HPV18 NEG Negative -   HRHPV NEG Negative -     Reviewed and updated as needed this visit by clinical staff    Allergies  Meds              Reviewed and updated as needed this visit by Provider                 Past Medical History:   Diagnosis Date     Abnormal Pap smear of cervix     ASCUS, negative HPV      Allergic     gluten intolerant     Varicella     as a child      Past Surgical History:   Procedure Laterality Date      SECTION N/A 2017    Procedure:  SECTION;  Surgeon: Omero Purdy MD;  Location: [facility] OR;  Service:      AK INCISE FINGER TENDON SHEATH      Description: Hand Incision Tendon Sheath Of A Finger;  Recorded: 2011;  Comments: 2nd grade       Review of Systems  Per above     OBJECTIVE:   There were no vitals taken for this visit.  Physical Exam  GENERAL: healthy, alert and no distress  EYES: Eyes grossly normal to inspection, PERRL and conjunctivae and sclerae normal  HENT: ear canals and TM's normal, nose and mouth without ulcers or lesions  NECK: no adenopathy, no asymmetry, masses, or scars and thyroid normal to palpation  RESP: lungs clear to auscultation - no rales, rhonchi or wheezes  BREAST: normal without masses, tenderness or nipple discharge and no palpable axillary masses or adenopathy  CV: regular rate and rhythm, normal S1 S2, no S3 or S4, no murmur, click or rub, no peripheral edema and peripheral pulses strong  ABDOMEN: soft, nontender, no hepatosplenomegaly, no masses and bowel sounds normal   (female): normal female external genitalia, normal urethral meatus, vaginal mucosa pink, moist, well rugated, and normal cervix/adnexa/uterus without masses or discharge  MS: no gross musculoskeletal defects noted, no edema  SKIN: no suspicious lesions or rashes  NEURO:  "Normal strength and tone, mentation intact and speech normal  PSYCH: mentation appears normal, affect normal/bright    Labs reviewed in Epic    ASSESSMENT/PLAN:   Delma was seen today for physical.    Diagnoses and all orders for this visit:    Routine general medical examination at a health care facility  Patient is starting to experience symptoms of perimenopause.  Patient would like observation for now, which is very reasonable.    Dyshidrotic dermatitis  -     fluocinolone (SYNALAR) 0.01 % external cream; Apply topically 2 times daily  I did discuss use of this medication.  Consider using sunblock over the backs of her hands when going to lesli weather to see if this can help prevent an outbreak.  Recommend emollient cream to help with the flaking.    Visit for screening mammogram  -     *MA Screening Digital Bilateral; Future    Lipid screening  -     Lipid Profile (Chol, Trig, HDL, LDL calc); Future  -     Lipid Profile (Chol, Trig, HDL, LDL calc)    Screening for diabetes mellitus  -     Hemoglobin A1c; Future  -     Hemoglobin A1c    Other orders  -     REVIEW OF HEALTH MAINTENANCE PROTOCOL ORDERS        COUNSELING:  Reviewed preventive health counseling, as reflected in patient instructions       Regular exercise       Healthy diet/nutrition       Contraception    Estimated body mass index is 29.88 kg/m  as calculated from the following:    Height as of 11/18/20: 1.664 m (5' 5.5\").    Weight as of 11/18/20: 82.7 kg (182 lb 5 oz).    Weight management plan: Discussed healthy diet and exercise guidelines    She reports that she has quit smoking. She smoked 0.00 packs per day. She has never used smokeless tobacco.      Counseling Resources:  ATP IV Guidelines  Pooled Cohorts Equation Calculator  Breast Cancer Risk Calculator  BRCA-Related Cancer Risk Assessment: FHS-7 Tool  FRAX Risk Assessment  ICSI Preventive Guidelines  Dietary Guidelines for Americans, 2010  USDA's MyPlate  ASA Prophylaxis  Lung CA " Screening    Helen Leone MD  [facility]

## 2022-03-21 ENCOUNTER — ANCILLARY PROCEDURE (OUTPATIENT)
Dept: MAMMOGRAPHY | Facility: CLINIC | Age: 42
End: 2022-03-21
Attending: FAMILY MEDICINE
Payer: COMMERCIAL

## 2022-03-21 DIAGNOSIS — Z12.31 VISIT FOR SCREENING MAMMOGRAM: ICD-10-CM

## 2022-03-21 PROCEDURE — 77067 SCR MAMMO BI INCL CAD: CPT

## 2022-04-11 ENCOUNTER — OFFICE VISIT (OUTPATIENT)
Dept: FAMILY MEDICINE | Facility: CLINIC | Age: 42
End: 2022-04-11
Payer: COMMERCIAL

## 2022-04-11 VITALS
OXYGEN SATURATION: 98 % | SYSTOLIC BLOOD PRESSURE: 112 MMHG | BODY MASS INDEX: 31.06 KG/M2 | WEIGHT: 189.8 LBS | HEART RATE: 82 BPM | DIASTOLIC BLOOD PRESSURE: 64 MMHG

## 2022-04-11 DIAGNOSIS — L91.8 SKIN TAG: Primary | ICD-10-CM

## 2022-04-11 PROCEDURE — 11200 RMVL SKIN TAGS UP TO&INC 15: CPT | Performed by: FAMILY MEDICINE

## 2022-04-11 NOTE — PROGRESS NOTES
Skin Tags:  Mid back.    HPI: skin tag on mid back at bra line.  Left neck.  Bothersome.  Rubs, gets caught on clothing or with scratching back.     Procedural note.  Discussed the risks and benefits of her treatment options and patient agrees to proceed with skin tag removal.  Verbal consent was given.  Areas were cleansed using an alcoholic swab.  1% lidocaine withepinephrine was injected locally 0.2cc.  A forceps and iris scissors were used to remove the skin tags at the bases.  Silver nitrate swab was applied at the base for chemical cautery.  Wound care was discussed with the patient.    Total number removed: 2    Complications: None.    Estimated blood loss: Trace.    Follow-up: Discussed signs and symptoms for which to notify the clinic, otherwise as needed.

## 2022-10-26 ENCOUNTER — OFFICE VISIT (OUTPATIENT)
Dept: FAMILY MEDICINE | Facility: CLINIC | Age: 42
End: 2022-10-26
Payer: COMMERCIAL

## 2022-10-26 VITALS
HEIGHT: 65 IN | TEMPERATURE: 98.5 F | SYSTOLIC BLOOD PRESSURE: 126 MMHG | RESPIRATION RATE: 20 BRPM | BODY MASS INDEX: 32.39 KG/M2 | DIASTOLIC BLOOD PRESSURE: 78 MMHG | OXYGEN SATURATION: 98 % | HEART RATE: 82 BPM | WEIGHT: 194.4 LBS

## 2022-10-26 DIAGNOSIS — F32.81 PMDD (PREMENSTRUAL DYSPHORIC DISORDER): Primary | ICD-10-CM

## 2022-10-26 PROCEDURE — 99213 OFFICE O/P EST LOW 20 MIN: CPT | Performed by: FAMILY MEDICINE

## 2022-10-26 ASSESSMENT — PATIENT HEALTH QUESTIONNAIRE - PHQ9
SUM OF ALL RESPONSES TO PHQ QUESTIONS 1-9: 4
5. POOR APPETITE OR OVEREATING: NOT AT ALL

## 2022-10-26 ASSESSMENT — ANXIETY QUESTIONNAIRES
6. BECOMING EASILY ANNOYED OR IRRITABLE: SEVERAL DAYS
7. FEELING AFRAID AS IF SOMETHING AWFUL MIGHT HAPPEN: NOT AT ALL
3. WORRYING TOO MUCH ABOUT DIFFERENT THINGS: NOT AT ALL
IF YOU CHECKED OFF ANY PROBLEMS ON THIS QUESTIONNAIRE, HOW DIFFICULT HAVE THESE PROBLEMS MADE IT FOR YOU TO DO YOUR WORK, TAKE CARE OF THINGS AT HOME, OR GET ALONG WITH OTHER PEOPLE: SOMEWHAT DIFFICULT
GAD7 TOTAL SCORE: 1
GAD7 TOTAL SCORE: 1
5. BEING SO RESTLESS THAT IT IS HARD TO SIT STILL: NOT AT ALL
2. NOT BEING ABLE TO STOP OR CONTROL WORRYING: NOT AT ALL
1. FEELING NERVOUS, ANXIOUS, OR ON EDGE: NOT AT ALL

## 2022-10-26 ASSESSMENT — PAIN SCALES - GENERAL: PAINLEVEL: NO PAIN (0)

## 2022-10-26 NOTE — PROGRESS NOTES
Delma was seen today for follow up.    Diagnoses and all orders for this visit:    PMDD (premenstrual dysphoric disorder)    Patient is actually doing quite well.  Factors: that help: twins getting older, sleep, recent summer, new therapist.  Could be Talia supplement.  Things that may make it worse: if her twins sleep issues don't improve, winter as there may be a SAD component.  Plan: observe for now.  Start SAD light.  Incentivize twins to stay in bed when they wake in the night.  In the case that her symptoms do worsen with the season, we discussed at length the pros and cons of fluoxetine, as well as expected outcomes.      Subjective   Maranda is a 42 year old, presenting for the following health issues:  Follow Up    Patient is here today to follow up on mood, particularly as it relates to her cycle.     Found a therapist she is working with.  Met her once.  Feels like it will be a good fit.  Moods were difficult initially, but lately it is improving.  Started working out a month and a half ago once a week.   The kids just turned 5, so she and her  have started connecting again more.  Started taking an herbal.  Taking Talia supplement: Ashwaganda, chasteberry, and black cohosh.  Has an SAD light, will be starting that again.  Just got back from [place] so that has helped with mood.    PHQ9 = 4 today.  She notes that 3-4 months ago, irritability was a lot higher    Menses is 3.5 to q 4 weeks.  Going to acupuncture the week before helps.   Menses have been heavier, crampier.  Less hot flashes and monthly migraines on the herbal medication.  Migraines usually happen the week before.  Sometimes 2 days in a row.  Since high school would have visual aura with peripheral vision blurring.  Got a walking treadmill for under her desk.      Sleep will help.  Feels overall a lot better than 6-8 months ago.  When she gets multiple days of solid sleep, she feels great and feel like she can take on the world.  Her  "twins have been getting up more in the middle of the night and can cause sleep disturbance.  They are now 5.  Discussed positive chart based reward for staying in their bed.  Coming up with a plan for them that they can institute when they get up in the night.      Objective    /78 (BP Location: Left arm, Patient Position: Sitting, Cuff Size: Adult Regular)   Pulse 82   Temp 98.5  F (36.9  C) (Oral)   Resp 20   Ht 1.657 m (5' 5.25\")   Wt 88.2 kg (194 lb 6.4 oz)   LMP 10/09/2022 (Exact Date)   SpO2 98%   BMI 32.10 kg/m    Body mass index is 32.1 kg/m .  Physical Exam   GENERAL: healthy, alert and no distress  Psych Exam:  Behavior:normal    Mood:pleaseant, upbeat   Affect:normal   Eye Contact:normal   Thought Content: normal   Insight:normal    Judgement: normal      PATIENT HEALTH QUESTIONNAIRE-9 (PHQ - 9)    Over the last 2 weeks, how often have you been bothered by any of the following problems?    1. Little interest or pleasure in doing things -  Not at all   2. Feeling down, depressed, or hopeless -  Not at all   3. Trouble falling or staying asleep, or sleeping too much - Not at all   4. Feeling tired or having little energy -  More than half the days   5. Poor appetite or overeating -  Not at all   6. Feeling bad about yourself - or that you are a failure or have let yourself or your family down -  Several days   7. Trouble concentrating on things, such as reading the newspaper or watching television - Several days   8. Moving or speaking so slowly that other people could have noticed? Or the opposite - being so fidgety or restless that you have been moving around a lot more than usual Not at all   9. Thoughts that you would be better off dead or of hurting  yourself in some way Not at all   Total Score: 4     If you checked off any problems, how difficult have these problems made it for you to do your work, take care of things at home, or get along with other people? Somewhat " difficult    Developed by Payam Kaiser, Kayleen Iraheta, Jeramy Gordon and colleagues, with an educational yuki from [employer] No permission required to reproduce, translate, display or distribute. permission required to reproduce, translate, display or distribute.

## 2023-05-14 ENCOUNTER — HEALTH MAINTENANCE LETTER (OUTPATIENT)
Age: 43
End: 2023-05-14

## 2024-05-18 ENCOUNTER — HEALTH MAINTENANCE LETTER (OUTPATIENT)
Age: 44
End: 2024-05-18

## 2024-07-27 ENCOUNTER — HEALTH MAINTENANCE LETTER (OUTPATIENT)
Age: 44
End: 2024-07-27

## 2025-06-08 ENCOUNTER — HEALTH MAINTENANCE LETTER (OUTPATIENT)
Age: 45
End: 2025-06-08